# Patient Record
Sex: MALE | Race: BLACK OR AFRICAN AMERICAN | NOT HISPANIC OR LATINO | URBAN - METROPOLITAN AREA
[De-identification: names, ages, dates, MRNs, and addresses within clinical notes are randomized per-mention and may not be internally consistent; named-entity substitution may affect disease eponyms.]

---
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
## 2021-01-28 NOTE — ED PROVIDER NOTE - NS ED ATTENDING STATEMENT MOD
I have personally seen and examined this patient. I have fully participated in the care of this patient. I have reviewed all pertinent clinical information, including history physical exam, plan and the Resident's note and agree except as noted
Additional History: Would like cosmetic removal of angiomas.

## 2021-06-16 ENCOUNTER — INPATIENT (INPATIENT)
Facility: HOSPITAL | Age: 21
LOS: 6 days | Discharge: HOME | End: 2021-06-23
Attending: INTERNAL MEDICINE | Admitting: INTERNAL MEDICINE
Payer: MEDICAID

## 2021-06-16 VITALS
SYSTOLIC BLOOD PRESSURE: 140 MMHG | OXYGEN SATURATION: 93 % | WEIGHT: 210.1 LBS | TEMPERATURE: 98 F | RESPIRATION RATE: 25 BRPM | HEART RATE: 138 BPM | DIASTOLIC BLOOD PRESSURE: 90 MMHG | HEIGHT: 67 IN

## 2021-06-16 LAB
ANION GAP SERPL CALC-SCNC: 5 MMOL/L — SIGNIFICANT CHANGE UP (ref 5–17)
BASE EXCESS BLDA CALC-SCNC: -4 MMOL/L — LOW (ref -2–2)
BASE EXCESS BLDA CALC-SCNC: -5 MMOL/L — LOW (ref -2–2)
BASOPHILS # BLD AUTO: 0.02 K/UL — SIGNIFICANT CHANGE UP (ref 0–0.2)
BASOPHILS NFR BLD AUTO: 0.1 % — SIGNIFICANT CHANGE UP (ref 0–2)
BLOOD GAS COMMENTS: SIGNIFICANT CHANGE UP
BLOOD GAS SOURCE: SIGNIFICANT CHANGE UP
BLOOD GAS SOURCE: SIGNIFICANT CHANGE UP
BUN SERPL-MCNC: 11 MG/DL — SIGNIFICANT CHANGE UP (ref 7–23)
CALCIUM SERPL-MCNC: 8.5 MG/DL — SIGNIFICANT CHANGE UP (ref 8.5–10.1)
CHLORIDE SERPL-SCNC: 108 MMOL/L — SIGNIFICANT CHANGE UP (ref 96–108)
CO2 SERPL-SCNC: 27 MMOL/L — SIGNIFICANT CHANGE UP (ref 22–31)
CREAT SERPL-MCNC: 0.92 MG/DL — SIGNIFICANT CHANGE UP (ref 0.5–1.3)
EOSINOPHIL # BLD AUTO: 0 K/UL — SIGNIFICANT CHANGE UP (ref 0–0.5)
EOSINOPHIL NFR BLD AUTO: 0 % — SIGNIFICANT CHANGE UP (ref 0–6)
FLUAV AG NPH QL: SIGNIFICANT CHANGE UP
FLUBV AG NPH QL: SIGNIFICANT CHANGE UP
GLUCOSE SERPL-MCNC: 114 MG/DL — HIGH (ref 70–99)
HCO3 BLDA-SCNC: 22 MMOL/L — SIGNIFICANT CHANGE UP (ref 21–29)
HCO3 BLDA-SCNC: 24 MMOL/L — SIGNIFICANT CHANGE UP (ref 21–29)
HCT VFR BLD CALC: 47.9 % — SIGNIFICANT CHANGE UP (ref 39–50)
HCT VFR BLD CALC: 50.7 % — HIGH (ref 39–50)
HGB BLD-MCNC: 15.2 G/DL — SIGNIFICANT CHANGE UP (ref 13–17)
HGB BLD-MCNC: 16.4 G/DL — SIGNIFICANT CHANGE UP (ref 13–17)
HOROWITZ INDEX BLDA+IHG-RTO: 100 — SIGNIFICANT CHANGE UP
HOROWITZ INDEX BLDA+IHG-RTO: 100 — SIGNIFICANT CHANGE UP
IMM GRANULOCYTES NFR BLD AUTO: 0.9 % — SIGNIFICANT CHANGE UP (ref 0–1.5)
LYMPHOCYTES # BLD AUTO: 0.6 K/UL — LOW (ref 1–3.3)
LYMPHOCYTES # BLD AUTO: 3.4 % — LOW (ref 13–44)
MCHC RBC-ENTMCNC: 27.2 PG — SIGNIFICANT CHANGE UP (ref 27–34)
MCHC RBC-ENTMCNC: 27.4 PG — SIGNIFICANT CHANGE UP (ref 27–34)
MCHC RBC-ENTMCNC: 31.7 GM/DL — LOW (ref 32–36)
MCHC RBC-ENTMCNC: 32.3 GM/DL — SIGNIFICANT CHANGE UP (ref 32–36)
MCV RBC AUTO: 83.9 FL — SIGNIFICANT CHANGE UP (ref 80–100)
MCV RBC AUTO: 86.3 FL — SIGNIFICANT CHANGE UP (ref 80–100)
MONOCYTES # BLD AUTO: 0.23 K/UL — SIGNIFICANT CHANGE UP (ref 0–0.9)
MONOCYTES NFR BLD AUTO: 1.3 % — LOW (ref 2–14)
NEUTROPHILS # BLD AUTO: 16.4 K/UL — HIGH (ref 1.8–7.4)
NEUTROPHILS NFR BLD AUTO: 94.3 % — HIGH (ref 43–77)
NRBC # BLD: 0 /100 WBCS — SIGNIFICANT CHANGE UP (ref 0–0)
NRBC # BLD: 0 /100 WBCS — SIGNIFICANT CHANGE UP (ref 0–0)
PCO2 BLDA: 45 MMHG — SIGNIFICANT CHANGE UP (ref 32–46)
PCO2 BLDA: 62 MMHG — HIGH (ref 32–46)
PH BLD: 7.21 — LOW (ref 7.35–7.45)
PH BLD: 7.31 — LOW (ref 7.35–7.45)
PLATELET # BLD AUTO: 238 K/UL — SIGNIFICANT CHANGE UP (ref 150–400)
PLATELET # BLD AUTO: 241 K/UL — SIGNIFICANT CHANGE UP (ref 150–400)
PO2 BLDA: 204 MMHG — HIGH (ref 74–108)
PO2 BLDA: 205 MMHG — HIGH (ref 74–108)
POTASSIUM SERPL-MCNC: 3.8 MMOL/L — SIGNIFICANT CHANGE UP (ref 3.5–5.3)
POTASSIUM SERPL-SCNC: 3.8 MMOL/L — SIGNIFICANT CHANGE UP (ref 3.5–5.3)
RBC # BLD: 5.55 M/UL — SIGNIFICANT CHANGE UP (ref 4.2–5.8)
RBC # BLD: 6.04 M/UL — HIGH (ref 4.2–5.8)
RBC # FLD: 14.1 % — SIGNIFICANT CHANGE UP (ref 10.3–14.5)
RBC # FLD: 14.2 % — SIGNIFICANT CHANGE UP (ref 10.3–14.5)
SAO2 % BLDA: 99 % — HIGH (ref 92–96)
SAO2 % BLDA: 99 % — HIGH (ref 92–96)
SARS-COV-2 RNA SPEC QL NAA+PROBE: SIGNIFICANT CHANGE UP
SODIUM SERPL-SCNC: 140 MMOL/L — SIGNIFICANT CHANGE UP (ref 135–145)
WBC # BLD: 14.51 K/UL — HIGH (ref 3.8–10.5)
WBC # BLD: 17.4 K/UL — HIGH (ref 3.8–10.5)
WBC # FLD AUTO: 14.51 K/UL — HIGH (ref 3.8–10.5)
WBC # FLD AUTO: 17.4 K/UL — HIGH (ref 3.8–10.5)

## 2021-06-16 PROCEDURE — 71045 X-RAY EXAM CHEST 1 VIEW: CPT | Mod: 26,77

## 2021-06-16 PROCEDURE — 93010 ELECTROCARDIOGRAM REPORT: CPT

## 2021-06-16 PROCEDURE — 99291 CRITICAL CARE FIRST HOUR: CPT

## 2021-06-16 PROCEDURE — 99292 CRITICAL CARE ADDL 30 MIN: CPT

## 2021-06-16 PROCEDURE — 71045 X-RAY EXAM CHEST 1 VIEW: CPT | Mod: 26

## 2021-06-16 RX ORDER — ALBUTEROL 90 UG/1
1 AEROSOL, METERED ORAL EVERY 4 HOURS
Refills: 0 | Status: DISCONTINUED | OUTPATIENT
Start: 2021-06-16 | End: 2021-06-16

## 2021-06-16 RX ORDER — CISATRACURIUM BESYLATE 2 MG/ML
3 INJECTION INTRAVENOUS
Qty: 200 | Refills: 0 | Status: DISCONTINUED | OUTPATIENT
Start: 2021-06-16 | End: 2021-06-18

## 2021-06-16 RX ORDER — TIOTROPIUM BROMIDE 18 UG/1
1 CAPSULE ORAL; RESPIRATORY (INHALATION) DAILY
Refills: 0 | Status: DISCONTINUED | OUTPATIENT
Start: 2021-06-16 | End: 2021-06-16

## 2021-06-16 RX ORDER — EPINEPHRINE 0.3 MG/.3ML
0.3 INJECTION INTRAMUSCULAR; SUBCUTANEOUS ONCE
Refills: 0 | Status: COMPLETED | OUTPATIENT
Start: 2021-06-16 | End: 2021-06-16

## 2021-06-16 RX ORDER — ROCURONIUM BROMIDE 10 MG/ML
100 VIAL (ML) INTRAVENOUS ONCE
Refills: 0 | Status: COMPLETED | OUTPATIENT
Start: 2021-06-16 | End: 2021-06-16

## 2021-06-16 RX ORDER — FENTANYL CITRATE 50 UG/ML
100 INJECTION INTRAVENOUS ONCE
Refills: 0 | Status: DISCONTINUED | OUTPATIENT
Start: 2021-06-16 | End: 2021-06-16

## 2021-06-16 RX ORDER — ETOMIDATE 2 MG/ML
20 INJECTION INTRAVENOUS ONCE
Refills: 0 | Status: COMPLETED | OUTPATIENT
Start: 2021-06-16 | End: 2021-06-16

## 2021-06-16 RX ORDER — ALBUTEROL 90 UG/1
2.5 AEROSOL, METERED ORAL
Refills: 0 | Status: COMPLETED | OUTPATIENT
Start: 2021-06-16 | End: 2021-06-16

## 2021-06-16 RX ORDER — SODIUM CHLORIDE 9 MG/ML
1000 INJECTION INTRAMUSCULAR; INTRAVENOUS; SUBCUTANEOUS ONCE
Refills: 0 | Status: COMPLETED | OUTPATIENT
Start: 2021-06-16 | End: 2021-06-16

## 2021-06-16 RX ORDER — CISATRACURIUM BESYLATE 2 MG/ML
10 INJECTION INTRAVENOUS ONCE
Refills: 0 | Status: COMPLETED | OUTPATIENT
Start: 2021-06-16 | End: 2021-06-16

## 2021-06-16 RX ORDER — MAGNESIUM SULFATE 500 MG/ML
2 VIAL (ML) INJECTION ONCE
Refills: 0 | Status: COMPLETED | OUTPATIENT
Start: 2021-06-16 | End: 2021-06-16

## 2021-06-16 RX ORDER — CHLORHEXIDINE GLUCONATE 213 G/1000ML
15 SOLUTION TOPICAL EVERY 12 HOURS
Refills: 0 | Status: DISCONTINUED | OUTPATIENT
Start: 2021-06-16 | End: 2021-06-16

## 2021-06-16 RX ORDER — CHLORHEXIDINE GLUCONATE 213 G/1000ML
1 SOLUTION TOPICAL
Refills: 0 | Status: DISCONTINUED | OUTPATIENT
Start: 2021-06-16 | End: 2021-06-23

## 2021-06-16 RX ORDER — IPRATROPIUM/ALBUTEROL SULFATE 18-103MCG
3 AEROSOL WITH ADAPTER (GRAM) INHALATION ONCE
Refills: 0 | Status: COMPLETED | OUTPATIENT
Start: 2021-06-16 | End: 2021-06-16

## 2021-06-16 RX ORDER — IPRATROPIUM/ALBUTEROL SULFATE 18-103MCG
3 AEROSOL WITH ADAPTER (GRAM) INHALATION EVERY 6 HOURS
Refills: 0 | Status: DISCONTINUED | OUTPATIENT
Start: 2021-06-16 | End: 2021-06-17

## 2021-06-16 RX ORDER — HEPARIN SODIUM 5000 [USP'U]/ML
5000 INJECTION INTRAVENOUS; SUBCUTANEOUS EVERY 8 HOURS
Refills: 0 | Status: DISCONTINUED | OUTPATIENT
Start: 2021-06-16 | End: 2021-06-18

## 2021-06-16 RX ORDER — SODIUM CHLORIDE 9 MG/ML
1000 INJECTION, SOLUTION INTRAVENOUS
Refills: 0 | Status: DISCONTINUED | OUTPATIENT
Start: 2021-06-16 | End: 2021-06-17

## 2021-06-16 RX ORDER — PROPOFOL 10 MG/ML
10 INJECTION, EMULSION INTRAVENOUS
Qty: 1000 | Refills: 0 | Status: DISCONTINUED | OUTPATIENT
Start: 2021-06-16 | End: 2021-06-20

## 2021-06-16 RX ORDER — KETAMINE HYDROCHLORIDE 100 MG/ML
0.25 INJECTION INTRAMUSCULAR; INTRAVENOUS
Qty: 1000 | Refills: 0 | Status: DISCONTINUED | OUTPATIENT
Start: 2021-06-16 | End: 2021-06-20

## 2021-06-16 RX ORDER — CHLORHEXIDINE GLUCONATE 213 G/1000ML
15 SOLUTION TOPICAL EVERY 12 HOURS
Refills: 0 | Status: DISCONTINUED | OUTPATIENT
Start: 2021-06-16 | End: 2021-06-17

## 2021-06-16 RX ORDER — PROPOFOL 10 MG/ML
20 INJECTION, EMULSION INTRAVENOUS
Qty: 500 | Refills: 0 | Status: DISCONTINUED | OUTPATIENT
Start: 2021-06-16 | End: 2021-06-16

## 2021-06-16 RX ORDER — DEXMEDETOMIDINE HYDROCHLORIDE IN 0.9% SODIUM CHLORIDE 4 UG/ML
0.2 INJECTION INTRAVENOUS
Qty: 200 | Refills: 0 | Status: DISCONTINUED | OUTPATIENT
Start: 2021-06-16 | End: 2021-06-18

## 2021-06-16 RX ORDER — FENTANYL CITRATE 50 UG/ML
50 INJECTION INTRAVENOUS ONCE
Refills: 0 | Status: DISCONTINUED | OUTPATIENT
Start: 2021-06-16 | End: 2021-06-16

## 2021-06-16 RX ORDER — IPRATROPIUM BROMIDE 0.2 MG/ML
500 SOLUTION, NON-ORAL INHALATION ONCE
Refills: 0 | Status: COMPLETED | OUTPATIENT
Start: 2021-06-16 | End: 2021-06-16

## 2021-06-16 RX ORDER — AZITHROMYCIN 500 MG/1
500 TABLET, FILM COATED ORAL ONCE
Refills: 0 | Status: COMPLETED | OUTPATIENT
Start: 2021-06-16 | End: 2021-06-16

## 2021-06-16 RX ADMIN — FENTANYL CITRATE 50 MICROGRAM(S): 50 INJECTION INTRAVENOUS at 20:36

## 2021-06-16 RX ADMIN — PROPOFOL 11.4 MICROGRAM(S)/KG/MIN: 10 INJECTION, EMULSION INTRAVENOUS at 19:35

## 2021-06-16 RX ADMIN — AZITHROMYCIN 255 MILLIGRAM(S): 500 TABLET, FILM COATED ORAL at 17:29

## 2021-06-16 RX ADMIN — CISATRACURIUM BESYLATE 10 MILLIGRAM(S): 2 INJECTION INTRAVENOUS at 23:45

## 2021-06-16 RX ADMIN — ALBUTEROL 2.5 MILLIGRAM(S): 90 AEROSOL, METERED ORAL at 15:22

## 2021-06-16 RX ADMIN — FENTANYL CITRATE 100 MICROGRAM(S): 50 INJECTION INTRAVENOUS at 21:17

## 2021-06-16 RX ADMIN — ALBUTEROL 2.5 MILLIGRAM(S): 90 AEROSOL, METERED ORAL at 14:02

## 2021-06-16 RX ADMIN — SODIUM CHLORIDE 1000 MILLILITER(S): 9 INJECTION INTRAMUSCULAR; INTRAVENOUS; SUBCUTANEOUS at 15:22

## 2021-06-16 RX ADMIN — Medication 500 MICROGRAM(S): at 21:44

## 2021-06-16 RX ADMIN — ALBUTEROL 2.5 MILLIGRAM(S): 90 AEROSOL, METERED ORAL at 14:17

## 2021-06-16 RX ADMIN — ALBUTEROL 2.5 MILLIGRAM(S): 90 AEROSOL, METERED ORAL at 14:59

## 2021-06-16 RX ADMIN — SODIUM CHLORIDE 1000 MILLILITER(S): 9 INJECTION INTRAMUSCULAR; INTRAVENOUS; SUBCUTANEOUS at 16:22

## 2021-06-16 RX ADMIN — ALBUTEROL 2.5 MILLIGRAM(S): 90 AEROSOL, METERED ORAL at 16:02

## 2021-06-16 RX ADMIN — DEXMEDETOMIDINE HYDROCHLORIDE IN 0.9% SODIUM CHLORIDE 4.77 MICROGRAM(S)/KG/HR: 4 INJECTION INTRAVENOUS at 21:17

## 2021-06-16 RX ADMIN — Medication 40 MILLIGRAM(S): at 19:00

## 2021-06-16 RX ADMIN — KETAMINE HYDROCHLORIDE 2.38 MG/KG/HR: 100 INJECTION INTRAMUSCULAR; INTRAVENOUS at 20:09

## 2021-06-16 RX ADMIN — FENTANYL CITRATE 50 MICROGRAM(S): 50 INJECTION INTRAVENOUS at 21:06

## 2021-06-16 RX ADMIN — HEPARIN SODIUM 5000 UNIT(S): 5000 INJECTION INTRAVENOUS; SUBCUTANEOUS at 21:17

## 2021-06-16 RX ADMIN — ETOMIDATE 20 MILLIGRAM(S): 2 INJECTION INTRAVENOUS at 19:03

## 2021-06-16 RX ADMIN — Medication 2 GRAM(S): at 15:00

## 2021-06-16 RX ADMIN — SODIUM CHLORIDE 1000 MILLILITER(S): 9 INJECTION INTRAMUSCULAR; INTRAVENOUS; SUBCUTANEOUS at 15:00

## 2021-06-16 RX ADMIN — Medication 50 GRAM(S): at 14:02

## 2021-06-16 RX ADMIN — ALBUTEROL 2.5 MILLIGRAM(S): 90 AEROSOL, METERED ORAL at 15:42

## 2021-06-16 RX ADMIN — Medication 125 MILLIGRAM(S): at 17:29

## 2021-06-16 RX ADMIN — FENTANYL CITRATE 100 MICROGRAM(S): 50 INJECTION INTRAVENOUS at 21:16

## 2021-06-16 RX ADMIN — AZITHROMYCIN 500 MILLIGRAM(S): 500 TABLET, FILM COATED ORAL at 18:28

## 2021-06-16 RX ADMIN — Medication 100 MILLIGRAM(S): at 19:03

## 2021-06-16 RX ADMIN — Medication 3 MILLILITER(S): at 14:02

## 2021-06-16 RX ADMIN — EPINEPHRINE 0.3 MILLIGRAM(S): 0.3 INJECTION INTRAMUSCULAR; SUBCUTANEOUS at 18:47

## 2021-06-16 RX ADMIN — SODIUM CHLORIDE 1000 MILLILITER(S): 9 INJECTION INTRAMUSCULAR; INTRAVENOUS; SUBCUTANEOUS at 14:02

## 2021-06-16 NOTE — H&P ADULT - ASSESSMENT
21 yo male presented with SOB and wheezing     1. Acute hypoxic respiratory failure secondary to asthma  exacerbation   Admit to medicine   s/p BIPAP , Solumedrol and bronchodilator in ED   Start Solumedrol 40 mg IV q6   DUONEB q6       21 yo male presented with SOB and wheezing     1. Acute hypoxic respiratory failure secondary to asthma  exacerbation   Admit to medicine   s/p BIPAP , Solumedrol and bronchodilator in ED   Start Solumedrol 40 mg IV q6   DUONEB q6

## 2021-06-16 NOTE — ED PROVIDER NOTE - OBJECTIVE STATEMENT
Pt is a 19 yo male w/PMH of asthma, without prior hospitalization, presents to the ED BIBEMS for SOB and wheezing associated with a mild cough since this morning. No fever, CP, leg swelling, or pain. Pt used inhaler without help, received magnesium nebulizers and decadron from EMS.

## 2021-06-16 NOTE — ED PROVIDER NOTE - PRINCIPAL DIAGNOSIS
Severe asthma with exacerbation, unspecified whether persistent Severe persistent asthma with status asthmaticus

## 2021-06-16 NOTE — CONSULT NOTE ADULT - SUBJECTIVE AND OBJECTIVE BOX
Pt is a 21 yo male w/PMH of asthma, without prior hospitalization, presents to the ED BIBEMS for SOB and wheezing associated with a mild cough since this morning. No fever, CP, leg swelling, or pain. Pt used inhaler without help, received magnesium, nebulizers, and decadron from EMS. Getting Azithromycin 500mg and Solumedrol 125 mg IVP at time of assessment in ER. Pt appeared comfortable, breathing 18-20/minute w/O2 sat 93-96% on 3L NC in no acute distress. Pt endorses suffering from asthma since he was approximately 5 years old. As of now, the only prescription he uses to manage his asthma is Albuterol, which he admits to using as much as 5x daily. Of note, he states waking up in the middle of the night ~3x/week with asthma symptoms requiring inhaler use. On assessment, patient has mild/mod bilateral wheezing, but with good air entry/movement. Pt is tachycardic 120's-130's, but likely associated with repeated albuterol doses at home, w/EMS, and in ED. Pt being placed on BiPaP at time of assessment.     Based off assessment, pt is not clinically deteriorating and is able to protect his airway at this time. /72 on monitor; patient is medically appropriate for transfer to tele/medicine floor.       CBC Full  -  ( 16 Jun 2021 14:24 )  WBC Count : 14.51 K/uL  RBC Count : 6.04 M/uL  Hemoglobin : 16.4 g/dL  Hematocrit : 50.7 %  Platelet Count - Automated : 238 K/uL  Mean Cell Volume : 83.9 fl  Mean Cell Hemoglobin : 27.2 pg  Mean Cell Hemoglobin Concentration : 32.3 gm/dL  Auto Neutrophil # : x  Auto Lymphocyte # : x  Auto Monocyte # : x  Auto Eosinophil # : x  Auto Basophil # : x  Auto Neutrophil % : x  Auto Lymphocyte % : x  Auto Monocyte % : x  Auto Eosinophil % : x  Auto Basophil % : x    BMI: BMI (kg/m2): 32.9 (06-16-21 @ 13:32)  HbA1c:   Glucose:   BP: 125/72 (06-16-21 @ 15:35) (125/72 - 140/90)  Lipid Panel:       VITALS  ========  Vital Signs Last 24 Hrs  T(C): 36.8 (16 Jun 2021 15:35), Max: 36.9 (16 Jun 2021 13:32)  T(F): 98.3 (16 Jun 2021 15:35), Max: 98.5 (16 Jun 2021 13:32)  HR: 127 (16 Jun 2021 17:40) (123 - 138)  BP: 125/72 (16 Jun 2021 15:35) (125/72 - 140/90)  BP(mean): --  RR: 24 (16 Jun 2021 15:42) (19 - 25)  SpO2: 98% (16 Jun 2021 17:40) (92% - 98%)  I&O's Summary      LABS                                            16.4                  Neurophils% (auto):   x      (06-16 @ 14:24):    14.51)-----------(238          Lymphocytes% (auto):  x                                             50.7                   Eosinphils% (auto):   x        Manual%: Neutrophils x    ; Lymphocytes x    ; Eosinophils x    ; Bands%: x    ; Blasts x                                    140    |  108    |  11                  Calcium: 8.5   / iCa: x      (06-16 @ 14:24)    ----------------------------<  114       Magnesium: x                                3.8     |  27     |  0.92             Phosphorous: x                   Pt is a 19 yo male w/PMH of asthma, without prior hospitalization, presents to the ED BIBEMS for SOB and wheezing associated with a mild cough since this morning. No fever, CP, leg swelling, or pain. Pt used inhaler without help, received magnesium, nebulizers, and decadron from EMS. Getting Azithromycin 500mg and Solumedrol 125 mg IVP at time of assessment in ER. Pt appeared comfortable, breathing 18-20/minute w/O2 sat 93-96% on 3L NC in no acute distress. Pt endorses suffering from asthma since he was approximately 5 years old. As of now, the only prescription he uses to manage his asthma is Albuterol, which he admits to using as much as 5x daily. Of note, he states waking up in the middle of the night ~3x/week with asthma symptoms requiring inhaler use. On assessment, patient has mild/mod bilateral wheezing, but with good air entry/movement. Pt is tachycardic 120's-130's, but likely associated with repeated albuterol doses at home, w/EMS, and in ED. Pt being placed on BiPaP at time of assessment.       CBC Full  -  ( 16 Jun 2021 14:24 )  WBC Count : 14.51 K/uL  RBC Count : 6.04 M/uL  Hemoglobin : 16.4 g/dL  Hematocrit : 50.7 %  Platelet Count - Automated : 238 K/uL  Mean Cell Volume : 83.9 fl  Mean Cell Hemoglobin : 27.2 pg  Mean Cell Hemoglobin Concentration : 32.3 gm/dL  Auto Neutrophil # : x  Auto Lymphocyte # : x  Auto Monocyte # : x  Auto Eosinophil # : x  Auto Basophil # : x  Auto Neutrophil % : x  Auto Lymphocyte % : x  Auto Monocyte % : x  Auto Eosinophil % : x  Auto Basophil % : x    BMI: BMI (kg/m2): 32.9 (06-16-21 @ 13:32)  HbA1c:   Glucose:   BP: 125/72 (06-16-21 @ 15:35) (125/72 - 140/90)  Lipid Panel:       VITALS  ========  Vital Signs Last 24 Hrs  T(C): 36.8 (16 Jun 2021 15:35), Max: 36.9 (16 Jun 2021 13:32)  T(F): 98.3 (16 Jun 2021 15:35), Max: 98.5 (16 Jun 2021 13:32)  HR: 127 (16 Jun 2021 17:40) (123 - 138)  BP: 125/72 (16 Jun 2021 15:35) (125/72 - 140/90)  BP(mean): --  RR: 24 (16 Jun 2021 15:42) (19 - 25)  SpO2: 98% (16 Jun 2021 17:40) (92% - 98%)  I&O's Summary      LABS                                            16.4                  Neurophils% (auto):   x      (06-16 @ 14:24):    14.51)-----------(238          Lymphocytes% (auto):  x                                             50.7                   Eosinphils% (auto):   x        Manual%: Neutrophils x    ; Lymphocytes x    ; Eosinophils x    ; Bands%: x    ; Blasts x                                    140    |  108    |  11                  Calcium: 8.5   / iCa: x      (06-16 @ 14:24)    ----------------------------<  114       Magnesium: x                                3.8     |  27     |  0.92             Phosphorous: x                   19 yo male w/PMH of asthma, without prior hospitalization, presents to the ED BIBEMS for SOB and wheezing associated with a mild cough since this morning. No fever, CP, leg swelling, or pain. Pt used inhaler without help, received magnesium, nebulizers, and decadron from EMS. Getting Azithromycin 500mg and Solumedrol 125 mg IVP at time of assessment in ER. Pt appeared comfortable, breathing 18-20/minute w/O2 sat 93-96% on 3L NC in no acute distress. Pt endorses suffering from asthma since he was approximately 5 years old. As of now, the only prescription he uses to manage his asthma is Albuterol, which he admits to using as much as 5x daily. Of note, he states waking up in the middle of the night ~3x/week with asthma symptoms requiring inhaler use. On assessment, patient has mild/mod bilateral wheezing, but with good air entry/movement. Pt is tachycardic 120's-130's, but likely associated with repeated albuterol doses at home, w/EMS, and in ED. Pt being placed on BiPaP at time of assessment. Pt was taken to CT and received IM epi beforehand. Pt was s/p RRT for SOB while laying flat and was emergently intubated for airway protection. Admit to ICU for further management.      CBC Full  -  ( 16 Jun 2021 14:24 )  WBC Count : 14.51 K/uL  RBC Count : 6.04 M/uL  Hemoglobin : 16.4 g/dL  Hematocrit : 50.7 %  Platelet Count - Automated : 238 K/uL  Mean Cell Volume : 83.9 fl  Mean Cell Hemoglobin : 27.2 pg  Mean Cell Hemoglobin Concentration : 32.3 gm/dL  Auto Neutrophil # : x  Auto Lymphocyte # : x  Auto Monocyte # : x  Auto Eosinophil # : x  Auto Basophil # : x  Auto Neutrophil % : x  Auto Lymphocyte % : x  Auto Monocyte % : x  Auto Eosinophil % : x  Auto Basophil % : x    BMI: BMI (kg/m2): 32.9 (06-16-21 @ 13:32)  HbA1c:   Glucose:   BP: 125/72 (06-16-21 @ 15:35) (125/72 - 140/90)  Lipid Panel:       VITALS  ========  Vital Signs Last 24 Hrs  T(C): 36.8 (16 Jun 2021 15:35), Max: 36.9 (16 Jun 2021 13:32)  T(F): 98.3 (16 Jun 2021 15:35), Max: 98.5 (16 Jun 2021 13:32)  HR: 127 (16 Jun 2021 17:40) (123 - 138)  BP: 125/72 (16 Jun 2021 15:35) (125/72 - 140/90)  BP(mean): --  RR: 24 (16 Jun 2021 15:42) (19 - 25)  SpO2: 98% (16 Jun 2021 17:40) (92% - 98%)  I&O's Summary      LABS                                            16.4                  Neurophils% (auto):   x      (06-16 @ 14:24):    14.51)-----------(238          Lymphocytes% (auto):  x                                             50.7                   Eosinphils% (auto):   x        Manual%: Neutrophils x    ; Lymphocytes x    ; Eosinophils x    ; Bands%: x    ; Blasts x                                    140    |  108    |  11                  Calcium: 8.5   / iCa: x      (06-16 @ 14:24)    ----------------------------<  114       Magnesium: x                                3.8     |  27     |  0.92             Phosphorous: x

## 2021-06-16 NOTE — ED PROVIDER NOTE - PROGRESS NOTE DETAILS
despite multiple rounds of tretment patient persistnetly wheezing in ED. hypoxic to 92% with tachypnea. will place on bipap for relief mirian develops chest pain after ebingt placed on bipap. bedside US without sign of PTX, no right heart strain. CXR with no sign of PTX. CT ordered. IM epi ordered. mirian sudedenly decompensates with severe tachypnea and agitation 2/2 hypoxia, hypoxic to 70% on NRB. intubated for severe hypoxia and status asmaticus

## 2021-06-16 NOTE — ED ADULT NURSE REASSESSMENT NOTE - NS ED NURSE REASSESS COMMENT FT1
Assuming patient's care for coverage. Report received from RN and patient informed during rounding. Assessment available on KBC. Will continue to monitor  Endorsed to by covering RN that pt received 2 out of 3 nebs, magnesium IV and IV fluids completed, pt assessed and states he feels much better

## 2021-06-16 NOTE — SBIRT NOTE ADULT - NSSBIRTDRGBRIEFINTDET_GEN_A_CORE
Provided SBIRT services: Full screen positive. Pt reports marijuana use every other day. Brief Intervention Performed. Screening results were reviewed with the patient and patient was provided information about healthy guidelines and potential negative consequences associated with level of risk. Motivation and readiness to reduce or stop use was discussed and goals and activities to make changes were suggested/offered. Pt not interested in reduction/cessation at this time.

## 2021-06-16 NOTE — ED ADULT NURSE NOTE - ED STAT RN HANDOFF DETAILS
Report endorsed to oncoming ED HOLD/ICU Mayra LAINEZ. Safety checks compld this shift/Safety rounds completed hourly.  IV sites checked Q2+remains WDL. Meds given as ord with no s/s of adverse RXNs. Fall +skin precs in place. Any issues endorsed to oncoming RN for follow up.  follow up on the meds, reassess airway and vent settings, Report endorsed to oncoming ED HOLD/ICU Mayra LAINEZ. Safety checks compld this shift/Safety rounds completed hourly.  IV sites checked Q2+remains WDL. Meds given as ord with no s/s of adverse RXNs. Fall +skin precs in place. Any issues endorsed to oncoming RN for follow up.  follow up on the meds, reassess airway and vent settings,pt is intubated with 7.5 ETT and 21 at the lip, pt was emergently intubated ay 19:06

## 2021-06-16 NOTE — H&P ADULT - ATTENDING COMMENTS
Agree with above.  20M PMH asthma (first diagnosed as a child), appears to have moderate–severe persistent asthma (only on rescue inhalers, uses 2–4x daily) active marijuana use (smokes) never intubated, presented with wheezing and dyspnea.  Initially did not require ICU. However, had RRT when lying flat in CT scanner, and was emergently brought back to ED and intubated.    Now plateau mid-20s, peaks in the 40s. Has been given steroids / NEBs. Tachycardic, but likely consequence of significant beta-agonist administration (including IM epi given in ED).     Multiple vent changes made. Now paralyzed for ventilator synchrony.  - Check RVP  - empiric abx with ceftriaxone / azithromycin  - steroids  - DuoNEBs Q6h and albuterol Q2h ATC for now.  - Hyperkalemia: insulin / D50, c/w albuterol, and give some Lasix (20mg IVP should be sufficient) for kaluresis.  - Will also give 1L 150mEq bicarb for hyperkalemia and mild metabolic acidosis (BE -5 and hyperchloremic)  - Analysis of vent waveforms shows significantly slurred uptake on expiratory flow, consistent with obstructive ventilatory defect. Changed vent settings to maximize ventilation while remaining mindful of avoiding autoPEEP. CXR shows no pneumothorax.  - Elevated peak pressures are *not* worrisome but rather are indicative of the obstructive ventilatory defect. As long as plateau remains =30, we can increase VT as tolerated. Would not decrease I-time below 0.7sec.  - Repeat ABG in an hour.  - POCU/S shows good cardiac contractility with tachycardia. No RV dilation.  - CXR with no evidence of pneumonia.  - Continue to monitor in ICU while on vent.    I have personally provided 75 minutes of critical care time. Agree with above.  20M PMH asthma (first diagnosed as a child), appears to have moderate–severe persistent asthma (only on rescue inhalers, uses 2–4x daily) active marijuana use (smokes) never intubated, presented with wheezing and dyspnea.  Initially did not require ICU. However, had RRT when lying flat in CT scanner, and was emergently brought back to ED and intubated.    Now plateau mid-20s, peaks in the 40s. Has been given steroids / NEBs. Tachycardic, but likely consequence of significant beta-agonist administration (including IM epi given in ED).     Multiple vent changes made. Now paralyzed for ventilator synchrony.  - Check RVP  - empiric abx with ceftriaxone / azithromycin  - steroids  - DuoNEBs Q6h and albuterol Q2h ATC for now.  - Hyperkalemia: insulin / D50, c/w albuterol, and give some Lasix (20mg IVP should be sufficient) for kaluresis.  - Will also give 1L 150mEq bicarb for hyperkalemia and mild metabolic acidosis (BE -5 and hyperchloremic)  - Analysis of vent waveforms shows significantly slurred uptake on expiratory flow, consistent with obstructive ventilatory defect. Changed vent settings to maximize ventilation while remaining mindful of avoiding autoPEEP. CXR shows no pneumothorax.  - Elevated peak pressures are *not* worrisome but rather are indicative of the obstructive ventilatory defect. As long as plateau remains =30, we can increase VT as tolerated. Would not decrease I-time below 0.7sec.  - Repeat ABG in an hour.  - Ketamine for sedation and possibly to help with bronchodilation.  - Additional sedation with propofol. Can consider fentanyl pushes as needed.  - POCU/S shows good cardiac contractility with tachycardia. No RV dilation.  - CXR with no evidence of pneumonia.  - Continue to monitor in ICU while on vent.    I have personally provided 75 minutes of critical care time.

## 2021-06-16 NOTE — H&P ADULT - ASSESSMENT
20 y.o. male w/PMHx significant for asthma no hx of asthma related hospitalizations or intubation, now intubated for airway protection    Neuro:  - ketamine and prop for sedation  Cardio:  - no issues  Resp:  - hx asthma, now with first time intubation for airway protection/asthma exacerbation and hypoxia  - steroids, scheduled nebs  - wean ventilator when apprpriate  - CXR, ABG  GI:   - NPO for now  Renal:  - Monitor Cr  Infectious Disease  - no issues  Heme:  - DVT ppx HSQ  Endocrine:  - Monitor FS as may increase due to steroids  :  - arlette for I+O    Disposition: Admit to ICU

## 2021-06-16 NOTE — ED PROVIDER NOTE - NS_ATTENDINGSCRIBE_ED_ALL_ED
1 month refill sent. Needs DM f/u.    Tom Bliss PA-C     I personally performed the service described in the documentation recorded by the scribe in my presence, and it accurately and completely records my words and actions.

## 2021-06-16 NOTE — ED PROVIDER NOTE - CARE PLAN
Principal Discharge DX:	Severe asthma with exacerbation, unspecified whether persistent   Principal Discharge DX:	Severe persistent asthma with status asthmaticus  Secondary Diagnosis:	Respiratory failure

## 2021-06-16 NOTE — H&P ADULT - NSHPREVIEWOFSYSTEMS_GEN_ALL_CORE
Review of Systems:   · General	negative  · Skin/Breast	negative  · Ophthalmologic	negative  · ENMT	negative  · Respiratory and Thorax Symptoms	wheezing  dyspnea  · Cardiovascular	negative  · Gastrointestinal	negative  · Genitourinary	negative  · Musculoskeletal	negative  · Neurological	negative  · Psychiatric	negative  · Hematology/Lymphatics	negative  · Endocrine	negative  · Allergic/Immunologic	negative

## 2021-06-16 NOTE — ED ADULT NURSE NOTE - OBJECTIVE STATEMENT
received er bed 17 c/o asthma exacerbation with cough, shortness of breath and wheezing since morning slight pleuritic chest discomfort used albuterol inhaler at home prior to arrival without relief speaking full sentences without difficulty noted skin warm dry color good denies prior hx of intubation

## 2021-06-16 NOTE — PATIENT PROFILE ADULT - OVER THE PAST TWO WEEKS, HAVE YOU FELT LITTLE INTEREST OR PLEASURE IN DOING THINGS?
You can access the [facility] Patient Portal, offered by [facility], by registering with the following website: [email or web address]
pt intubated/sedated

## 2021-06-16 NOTE — ED ADULT NURSE NOTE - CHIEF COMPLAINT QUOTE
BIBA- from home Asthma exacerbation since 6am  Albuterol pump unsuccessful, never intubated in past  EMS- 2 Combivent, 12mg dexamethasone IV, 2gm MgSO4 IV  Right AC#20g

## 2021-06-16 NOTE — H&P ADULT - HISTORY OF PRESENT ILLNESS
19 yo male presented with SOB and wheezing 
21 yo male w/PMH of asthma, without prior hospitalization, presents to the ED BIBEMS for SOB and wheezing associated with a mild cough since this morning. No fever, CP, leg swelling, or pain. Pt used inhaler without help, received magnesium, nebulizers, and decadron from EMS. Getting Azithromycin 500mg and Solumedrol 125 mg IVP at time of assessment in ER. Pt appeared comfortable, breathing 18-20/minute w/O2 sat 93-96% on 3L NC in no acute distress. Pt endorses suffering from asthma since he was approximately 5 years old. As of now, the only prescription he uses to manage his asthma is Albuterol, which he admits to using as much as 5x daily. Of note, he states waking up in the middle of the night ~3x/week with asthma symptoms requiring inhaler use. On assessment, patient has mild/mod bilateral wheezing, but with good air entry/movement. Pt is tachycardic 120's-130's, but likely associated with repeated albuterol doses at home, w/EMS, and in ED. Pt being placed on BiPaP at time of assessment. Pt was taken to CT and received IM epi beforehand. Pt was s/p RRT for SOB while laying flat and was emergently intubated for airway protection. Admit to ICU for further management.

## 2021-06-16 NOTE — H&P ADULT - NSHPLABSRESULTS_GEN_ALL_CORE
CBC Full  -  ( 16 Jun 2021 14:24 )  WBC Count : 14.51 K/uL  RBC Count : 6.04 M/uL  Hemoglobin : 16.4 g/dL  Hematocrit : 50.7 %  Platelet Count - Automated : 238 K/uL  Mean Cell Volume : 83.9 fl  Mean Cell Hemoglobin : 27.2 pg  Mean Cell Hemoglobin Concentration : 32.3 gm/dL  Auto Neutrophil # : x  Auto Lymphocyte # : x  Auto Monocyte # : x  Auto Eosinophil # : x  Auto Basophil # : x  Auto Neutrophil % : x  Auto Lymphocyte % : x  Auto Monocyte % : x  Auto Eosinophil % : x  Auto Basophil % : x      06-16    140  |  108  |  11  ----------------------------<  114<H>  3.8   |  27  |  0.92    Ca    8.5      16 Jun 2021 14:24
.  LABS:                         16.4   14.51 )-----------( 238      ( 16 Jun 2021 14:24 )             50.7     06-16    140  |  108  |  11  ----------------------------<  114<H>  3.8   |  27  |  0.92    Ca    8.5      16 Jun 2021 14:24                RADIOLOGY, EKG & ADDITIONAL TESTS: Reviewed.

## 2021-06-16 NOTE — ED PROVIDER NOTE - CRITICAL CARE ATTENDING CONTRIBUTION TO CARE
multi modal treatment for asthma begun with worsening of patients condition, VS wnl, however patient having difficulty moving air. bipap ordered. multiple conversations held with ICU about management. patient develops chest pain after being placed on bipap. bedside US without sign of PTX, no right heart strain. CXR with no sign of PTX. CT ordered. IM epi ordered. patinet sudedenly decompensates with severe tachypnea and agitation 2/2 hypoxia, hypoxic to 70% on NRB. intubated for severe hypoxia and status asthmaticus

## 2021-06-16 NOTE — ED PROVIDER NOTE - CLINICAL SUMMARY MEDICAL DECISION MAKING FREE TEXT BOX
Pt presents w/ asthma exacerbation. Will empirically treat. Will evaluate for underlying infection w/ labs and chest x-ray.

## 2021-06-16 NOTE — ED ADULT TRIAGE NOTE - CHIEF COMPLAINT QUOTE
BIBA- from home Asthma exacerbation since 6am  Albuterol pump unsuccessful, never intubated in past  EMS- 2 combivent, 12mg dexamethasone IV, 2gm MgSO4 IV  Right AC#20g BIBA- from home Asthma exacerbation since 6am  Albuterol pump unsuccessful, never intubated in past  EMS- 2 Combivent, 12mg dexamethasone IV, 2gm MgSO4 IV  Right AC#20g

## 2021-06-16 NOTE — H&P ADULT - NSHPPHYSICALEXAM_GEN_ALL_CORE
Physical Exam:   · Constitutional	detailed exam  · Constitutional Details	well-groomed; respiratory distress; obese  · Respiratory Distress	mild  · Eyes	EOMI; PERRL; no drainage or redness  · ENMT	No oral lesions; no gross abnormalities  · Neck	No bruits; no thyromegaly or nodules  · Breasts	not examined  · Back	No deformity or limitation of movement  · Respiratory	detailed exam  · Respiratory Details	breath sounds equal; good air movement; wheezes  · Wheezes	upper L; lower L; lower R  · Cardiovascular	detailed exam  · Cardiovascular Details	regular rate and rhythm  · Cardiovascular Details	tachycardia; positive S1; positive S2  · Gastrointestinal	Soft, non-tender, no hepatosplenomegaly, normal bowel sounds  · Genitourinary	not examined  · Rectal	not examined  · Extremities	No cyanosis, clubbing or edema  · Vascular	Equal and normal pulses (carotid, femoral, dorsalis pedis)  · Neurological	Alert & oriented; no sensory, motor or coordination deficits, normal reflexes  · Skin	No lesions; no rash  · Lymph Nodes	No lymphadedenopathy  · Musculoskeletal	No joint pain, swelling or deformity; no limitation of movement  · Psychiatric	not examined

## 2021-06-16 NOTE — CONSULT NOTE ADULT - ASSESSMENT
This is a 20 y.o. male w/pmh significant for asthma. He has no previous hx of intubations or hospitalizations r/t asthma, and the condition is managed at home only w/albuterol presenting with an acute attack unrelieved by albuterol at home. VSS at this time, patient w/good air entry, satting 93-96% on 3L NC w/RR of 18-20. Patient medically stable for transfer to tele/medicine floor.     - BiPaP 50% placed in setting of acute asthma exacerbation; monitor sats, WOB/ s/s of clinical deterioration  - Admit to tele r/t tachycardia 120's-130's  - Daily steroids for asthma management   - npo while maintained on bipap    This is a 20 y.o. male w/pmh significant for asthma. He has no previous hx of intubations or hospitalizations r/t asthma, and the condition is managed at home only w/albuterol presenting with an acute attack unrelieved by albuterol at home. Based off assessment, pt is not clinically deteriorating and is able to protect his airway at this time. /72 on monitor; patient is medically appropriate for transfer to tele/medicine floor.       Recommendations    - BiPaP 50% placed in setting of acute asthma exacerbation; monitor sats, WOB/ s/s of clinical deterioration  - Admit to tele r/t tachycardia 120's-130's  - Nebs, steroids, Symbicort for asthma management      This is a 20 y.o. male w/pmh significant for asthma. He has no previous hx of intubations or hospitalizations r/t asthma, and the condition is managed at home only w/albuterol presenting with an acute attack unrelieved by albuterol at home. Based off assessment, pt is not clinically deteriorating and is able to protect his airway at this time. /72 on monitor; patient is medically appropriate for transfer to tele/medicine floor.     Recommendations    - BiPaP 50% placed in setting of acute asthma exacerbation; monitor sats, WOB/ s/s of clinical deterioration  - Admit to tele r/t tachycardia 120's-130's  - Nebs, steroids, Symbicort for asthma management      20 y.o. male w/PMHx significant for asthma no hx of asthma related hospitalizations or intubation, now intubated for airway protection    Neuro:  - ketamine and prop for sedation  Cardio:  - no issues  Resp:  - hx asthma, now with first time intubation for airway protection/asthma exacerbation and hypoxia  - steroids, scheduled nebs  - wean ventilator when apprpriate  - CXR, ABG  GI:   - NPO for now  Renal:  - Monitor Cr  Infectious Disease  - no issues  Heme:  - DVT ppx HSQ  Endocrine:  - Monitor FS as may increase due to steroids  :  - arlette for I+O    Disposition: Admit to ICU

## 2021-06-16 NOTE — ED ADULT NURSE NOTE - NSIMPLEMENTINTERV_GEN_ALL_ED
Implemented All Universal Safety Interventions:  [place] to call system. Call bell, personal items and telephone within reach. Instruct patient to call for assistance. Room bathroom lighting operational. Non-slip footwear when patient is off stretcher. Physically safe environment: no spills, clutter or unnecessary equipment. Stretcher in lowest position, wheels locked, appropriate side rails in place.

## 2021-06-16 NOTE — ED ADULT NURSE REASSESSMENT NOTE - NS ED NURSE REASSESS COMMENT FT1
pt had a RRT while going for CT of the chest, pt emergently brought back to ER [place] bed 4, pt intubated at 19:06  19:03 RSI meds given:  19:03 10mg etomidate   19:03 100 mg rocuronium   19:04 10mg etomidate   19:06 pt is intubated by MD Padmini; 7.5 tube and 21 at the lip  endorsed to MIGEL Coker pt had a RRT while going for CT of the chest, pt emergently brought back to ER [place] bed 4, pt intubated at 19:06  19:03 RSI meds given:  19:03 10mg etomidate   19:03 100 mg rocuronium   19:04 10mg etomidate   19:06 pt is intubated by MD Padmini; 7.5 tube and 21 at the lip  endorsed to [facility] ED Hold/ ICU RN, all RSI meds given by MD Padmini, pending order for RSI meds

## 2021-06-16 NOTE — ED ADULT NURSE REASSESSMENT NOTE - NS ED NURSE REASSESS COMMENT FT1
16:52 pt placed on BiPAP, explained the need as his oxygen saturation is 93-94% on nasal cannula and tachycardic in the range of 110-130's. Pt has received 6 nebs, steroids, magnesium yet pt still finds it hard to breath, MD Padmini requested to bedside, education done by myself, MD Padmini and Respiratory therapist. DEJAN Blandon made aware of pt status and that his overall health is not improving but deteriorating. ICU Consulted for the second time  18:30- pt does not want to be on BiPAP, pt placed on nonrebreather as he finds it more tolerable for the CT scan. ICU team at bedside along with myself, MD Padmini and respiratory therapist. pt oxygen saturation at this time is 100% on the nonrebreather  18:45- Rapid response called by CT scan, plan for pt to be intubated in ED [facility] bed #4

## 2021-06-17 LAB
ALBUMIN SERPL ELPH-MCNC: 3.1 G/DL — LOW (ref 3.3–5)
ALBUMIN SERPL ELPH-MCNC: 3.5 G/DL — SIGNIFICANT CHANGE UP (ref 3.3–5)
ALBUMIN SERPL ELPH-MCNC: 3.5 G/DL — SIGNIFICANT CHANGE UP (ref 3.3–5)
ALP SERPL-CCNC: 83 U/L — SIGNIFICANT CHANGE UP (ref 40–120)
ALP SERPL-CCNC: 86 U/L — SIGNIFICANT CHANGE UP (ref 40–120)
ALP SERPL-CCNC: 90 U/L — SIGNIFICANT CHANGE UP (ref 40–120)
ALT FLD-CCNC: 37 U/L — SIGNIFICANT CHANGE UP (ref 12–78)
ALT FLD-CCNC: 44 U/L — SIGNIFICANT CHANGE UP (ref 12–78)
ALT FLD-CCNC: 50 U/L — SIGNIFICANT CHANGE UP (ref 12–78)
AMPHET UR-MCNC: NEGATIVE — SIGNIFICANT CHANGE UP
ANION GAP SERPL CALC-SCNC: 6 MMOL/L — SIGNIFICANT CHANGE UP (ref 5–17)
ANION GAP SERPL CALC-SCNC: 6 MMOL/L — SIGNIFICANT CHANGE UP (ref 5–17)
ANION GAP SERPL CALC-SCNC: 8 MMOL/L — SIGNIFICANT CHANGE UP (ref 5–17)
ANION GAP SERPL CALC-SCNC: 8 MMOL/L — SIGNIFICANT CHANGE UP (ref 5–17)
APPEARANCE UR: CLEAR — SIGNIFICANT CHANGE UP
AST SERPL-CCNC: 13 U/L — LOW (ref 15–37)
AST SERPL-CCNC: 16 U/L — SIGNIFICANT CHANGE UP (ref 15–37)
AST SERPL-CCNC: 19 U/L — SIGNIFICANT CHANGE UP (ref 15–37)
BARBITURATES UR SCN-MCNC: NEGATIVE — SIGNIFICANT CHANGE UP
BASE EXCESS BLDA CALC-SCNC: -2.7 MMOL/L — LOW (ref -2–2)
BASE EXCESS BLDA CALC-SCNC: -4.8 MMOL/L — LOW (ref -2–2)
BASE EXCESS BLDA CALC-SCNC: 3.2 MMOL/L — HIGH (ref -2–2)
BENZODIAZ UR-MCNC: NEGATIVE — SIGNIFICANT CHANGE UP
BILIRUB SERPL-MCNC: 0.3 MG/DL — SIGNIFICANT CHANGE UP (ref 0.2–1.2)
BILIRUB SERPL-MCNC: 0.3 MG/DL — SIGNIFICANT CHANGE UP (ref 0.2–1.2)
BILIRUB SERPL-MCNC: 0.4 MG/DL — SIGNIFICANT CHANGE UP (ref 0.2–1.2)
BILIRUB UR-MCNC: NEGATIVE — SIGNIFICANT CHANGE UP
BLOOD GAS COMMENTS: SIGNIFICANT CHANGE UP
BLOOD GAS SOURCE: SIGNIFICANT CHANGE UP
BUN SERPL-MCNC: 12 MG/DL — SIGNIFICANT CHANGE UP (ref 7–23)
BUN SERPL-MCNC: 13 MG/DL — SIGNIFICANT CHANGE UP (ref 7–23)
CALCIUM SERPL-MCNC: 7.4 MG/DL — LOW (ref 8.5–10.1)
CALCIUM SERPL-MCNC: 7.7 MG/DL — LOW (ref 8.5–10.1)
CALCIUM SERPL-MCNC: 7.7 MG/DL — LOW (ref 8.5–10.1)
CALCIUM SERPL-MCNC: 8.4 MG/DL — LOW (ref 8.5–10.1)
CHLORIDE SERPL-SCNC: 105 MMOL/L — SIGNIFICANT CHANGE UP (ref 96–108)
CHLORIDE SERPL-SCNC: 107 MMOL/L — SIGNIFICANT CHANGE UP (ref 96–108)
CHLORIDE SERPL-SCNC: 107 MMOL/L — SIGNIFICANT CHANGE UP (ref 96–108)
CHLORIDE SERPL-SCNC: 111 MMOL/L — HIGH (ref 96–108)
CO2 SERPL-SCNC: 21 MMOL/L — LOW (ref 22–31)
CO2 SERPL-SCNC: 21 MMOL/L — LOW (ref 22–31)
CO2 SERPL-SCNC: 22 MMOL/L — SIGNIFICANT CHANGE UP (ref 22–31)
CO2 SERPL-SCNC: 28 MMOL/L — SIGNIFICANT CHANGE UP (ref 22–31)
COCAINE METAB.OTHER UR-MCNC: NEGATIVE — SIGNIFICANT CHANGE UP
COLOR SPEC: YELLOW — SIGNIFICANT CHANGE UP
COVID-19 SPIKE DOMAIN AB INTERP: POSITIVE
COVID-19 SPIKE DOMAIN ANTIBODY RESULT: 143 U/ML — HIGH
CREAT SERPL-MCNC: 1.19 MG/DL — SIGNIFICANT CHANGE UP (ref 0.5–1.3)
CREAT SERPL-MCNC: 1.22 MG/DL — SIGNIFICANT CHANGE UP (ref 0.5–1.3)
CREAT SERPL-MCNC: 1.35 MG/DL — HIGH (ref 0.5–1.3)
CREAT SERPL-MCNC: 1.46 MG/DL — HIGH (ref 0.5–1.3)
DIFF PNL FLD: NEGATIVE — SIGNIFICANT CHANGE UP
GLUCOSE BLDC GLUCOMTR-MCNC: 104 MG/DL — HIGH (ref 70–99)
GLUCOSE BLDC GLUCOMTR-MCNC: 118 MG/DL — HIGH (ref 70–99)
GLUCOSE BLDC GLUCOMTR-MCNC: 120 MG/DL — HIGH (ref 70–99)
GLUCOSE BLDC GLUCOMTR-MCNC: 178 MG/DL — HIGH (ref 70–99)
GLUCOSE BLDC GLUCOMTR-MCNC: 183 MG/DL — HIGH (ref 70–99)
GLUCOSE BLDC GLUCOMTR-MCNC: 244 MG/DL — HIGH (ref 70–99)
GLUCOSE SERPL-MCNC: 162 MG/DL — HIGH (ref 70–99)
GLUCOSE SERPL-MCNC: 174 MG/DL — HIGH (ref 70–99)
GLUCOSE SERPL-MCNC: 192 MG/DL — HIGH (ref 70–99)
GLUCOSE SERPL-MCNC: 198 MG/DL — HIGH (ref 70–99)
GLUCOSE UR QL: 50 MG/DL
HCO3 BLDA-SCNC: 24 MMOL/L — SIGNIFICANT CHANGE UP (ref 21–29)
HCO3 BLDA-SCNC: 25 MMOL/L — SIGNIFICANT CHANGE UP (ref 21–29)
HCO3 BLDA-SCNC: 30 MMOL/L — HIGH (ref 21–29)
HOROWITZ INDEX BLDA+IHG-RTO: 70 — SIGNIFICANT CHANGE UP
KETONES UR-MCNC: NEGATIVE — SIGNIFICANT CHANGE UP
LEUKOCYTE ESTERASE UR-ACNC: NEGATIVE — SIGNIFICANT CHANGE UP
MAGNESIUM SERPL-MCNC: 2.4 MG/DL — SIGNIFICANT CHANGE UP (ref 1.6–2.6)
METHADONE UR-MCNC: NEGATIVE — SIGNIFICANT CHANGE UP
NITRITE UR-MCNC: NEGATIVE — SIGNIFICANT CHANGE UP
OPIATES UR-MCNC: NEGATIVE — SIGNIFICANT CHANGE UP
PCO2 BLDA: 55 MMHG — HIGH (ref 32–46)
PCO2 BLDA: 56 MMHG — HIGH (ref 32–46)
PCO2 BLDA: 60 MMHG — HIGH (ref 32–46)
PCP SPEC-MCNC: SIGNIFICANT CHANGE UP
PCP UR-MCNC: NEGATIVE — SIGNIFICANT CHANGE UP
PH BLD: 7.22 — LOW (ref 7.35–7.45)
PH BLD: 7.27 — LOW (ref 7.35–7.45)
PH BLD: 7.35 — SIGNIFICANT CHANGE UP (ref 7.35–7.45)
PH UR: 6.5 — SIGNIFICANT CHANGE UP (ref 5–8)
PHOSPHATE SERPL-MCNC: 3.1 MG/DL — SIGNIFICANT CHANGE UP (ref 2.5–4.5)
PO2 BLDA: 76 MMHG — SIGNIFICANT CHANGE UP (ref 74–108)
PO2 BLDA: 77 MMHG — SIGNIFICANT CHANGE UP (ref 74–108)
PO2 BLDA: 81 MMHG — SIGNIFICANT CHANGE UP (ref 74–108)
POTASSIUM SERPL-MCNC: 4.4 MMOL/L — SIGNIFICANT CHANGE UP (ref 3.5–5.3)
POTASSIUM SERPL-MCNC: 5.5 MMOL/L — HIGH (ref 3.5–5.3)
POTASSIUM SERPL-MCNC: 6.7 MMOL/L — CRITICAL HIGH (ref 3.5–5.3)
POTASSIUM SERPL-MCNC: 7 MMOL/L — CRITICAL HIGH (ref 3.5–5.3)
POTASSIUM SERPL-SCNC: 4.4 MMOL/L — SIGNIFICANT CHANGE UP (ref 3.5–5.3)
POTASSIUM SERPL-SCNC: 5.5 MMOL/L — HIGH (ref 3.5–5.3)
POTASSIUM SERPL-SCNC: 6.7 MMOL/L — CRITICAL HIGH (ref 3.5–5.3)
POTASSIUM SERPL-SCNC: 7 MMOL/L — CRITICAL HIGH (ref 3.5–5.3)
PROCALCITONIN SERPL-MCNC: 0.68 NG/ML — HIGH (ref 0.02–0.1)
PROT SERPL-MCNC: 7.2 GM/DL — SIGNIFICANT CHANGE UP (ref 6–8.3)
PROT SERPL-MCNC: 7.4 GM/DL — SIGNIFICANT CHANGE UP (ref 6–8.3)
PROT SERPL-MCNC: 7.5 GM/DL — SIGNIFICANT CHANGE UP (ref 6–8.3)
PROT UR-MCNC: NEGATIVE MG/DL — SIGNIFICANT CHANGE UP
RAPID RVP RESULT: DETECTED
RV+EV RNA SPEC QL NAA+PROBE: DETECTED
SAO2 % BLDA: 95 % — SIGNIFICANT CHANGE UP (ref 92–96)
SAO2 % BLDA: 95 % — SIGNIFICANT CHANGE UP (ref 92–96)
SAO2 % BLDA: 96 % — SIGNIFICANT CHANGE UP (ref 92–96)
SARS-COV-2 IGG+IGM SERPL QL IA: 143 U/ML — HIGH
SARS-COV-2 IGG+IGM SERPL QL IA: POSITIVE
SARS-COV-2 RNA SPEC QL NAA+PROBE: SIGNIFICANT CHANGE UP
SODIUM SERPL-SCNC: 135 MMOL/L — SIGNIFICANT CHANGE UP (ref 135–145)
SODIUM SERPL-SCNC: 136 MMOL/L — SIGNIFICANT CHANGE UP (ref 135–145)
SODIUM SERPL-SCNC: 138 MMOL/L — SIGNIFICANT CHANGE UP (ref 135–145)
SODIUM SERPL-SCNC: 141 MMOL/L — SIGNIFICANT CHANGE UP (ref 135–145)
SP GR SPEC: 1.01 — SIGNIFICANT CHANGE UP (ref 1.01–1.02)
THC UR QL: NEGATIVE — SIGNIFICANT CHANGE UP
UROBILINOGEN FLD QL: NEGATIVE MG/DL — SIGNIFICANT CHANGE UP

## 2021-06-17 PROCEDURE — 99291 CRITICAL CARE FIRST HOUR: CPT | Mod: 25

## 2021-06-17 PROCEDURE — 71045 X-RAY EXAM CHEST 1 VIEW: CPT | Mod: 26

## 2021-06-17 PROCEDURE — 43753 TX GASTRO INTUB W/ASP: CPT | Mod: 59

## 2021-06-17 PROCEDURE — 71045 X-RAY EXAM CHEST 1 VIEW: CPT | Mod: 26,77

## 2021-06-17 RX ORDER — DEXTROSE 50 % IN WATER 50 %
25 SYRINGE (ML) INTRAVENOUS ONCE
Refills: 0 | Status: DISCONTINUED | OUTPATIENT
Start: 2021-06-17 | End: 2021-06-18

## 2021-06-17 RX ORDER — ALBUTEROL 90 UG/1
2.5 AEROSOL, METERED ORAL ONCE
Refills: 0 | Status: COMPLETED | OUTPATIENT
Start: 2021-06-17 | End: 2021-06-17

## 2021-06-17 RX ORDER — FENTANYL CITRATE 50 UG/ML
100 INJECTION INTRAVENOUS ONCE
Refills: 0 | Status: DISCONTINUED | OUTPATIENT
Start: 2021-06-17 | End: 2021-06-17

## 2021-06-17 RX ORDER — ALBUTEROL 90 UG/1
2.5 AEROSOL, METERED ORAL
Refills: 0 | Status: DISCONTINUED | OUTPATIENT
Start: 2021-06-17 | End: 2021-06-17

## 2021-06-17 RX ORDER — DEXTROSE 50 % IN WATER 50 %
50 SYRINGE (ML) INTRAVENOUS ONCE
Refills: 0 | Status: COMPLETED | OUTPATIENT
Start: 2021-06-17 | End: 2021-06-17

## 2021-06-17 RX ORDER — SODIUM CHLORIDE 9 MG/ML
1000 INJECTION, SOLUTION INTRAVENOUS
Refills: 0 | Status: COMPLETED | OUTPATIENT
Start: 2021-06-17 | End: 2021-06-17

## 2021-06-17 RX ORDER — INSULIN LISPRO 100/ML
VIAL (ML) SUBCUTANEOUS EVERY 6 HOURS
Refills: 0 | Status: DISCONTINUED | OUTPATIENT
Start: 2021-06-17 | End: 2021-06-18

## 2021-06-17 RX ORDER — ALBUTEROL 90 UG/1
2.5 AEROSOL, METERED ORAL EVERY 6 HOURS
Refills: 0 | Status: DISCONTINUED | OUTPATIENT
Start: 2021-06-17 | End: 2021-06-17

## 2021-06-17 RX ORDER — CEFTRIAXONE 500 MG/1
INJECTION, POWDER, FOR SOLUTION INTRAMUSCULAR; INTRAVENOUS
Refills: 0 | Status: COMPLETED | OUTPATIENT
Start: 2021-06-17 | End: 2021-06-23

## 2021-06-17 RX ORDER — CHLORHEXIDINE GLUCONATE 213 G/1000ML
15 SOLUTION TOPICAL EVERY 12 HOURS
Refills: 0 | Status: DISCONTINUED | OUTPATIENT
Start: 2021-06-17 | End: 2021-06-20

## 2021-06-17 RX ORDER — SODIUM CHLORIDE 9 MG/ML
1000 INJECTION, SOLUTION INTRAVENOUS
Refills: 0 | Status: DISCONTINUED | OUTPATIENT
Start: 2021-06-17 | End: 2021-06-18

## 2021-06-17 RX ORDER — CALCIUM GLUCONATE 100 MG/ML
2 VIAL (ML) INTRAVENOUS ONCE
Refills: 0 | Status: DISCONTINUED | OUTPATIENT
Start: 2021-06-17 | End: 2021-06-17

## 2021-06-17 RX ORDER — ALBUTEROL 90 UG/1
2.5 AEROSOL, METERED ORAL EVERY 4 HOURS
Refills: 0 | Status: DISCONTINUED | OUTPATIENT
Start: 2021-06-17 | End: 2021-06-17

## 2021-06-17 RX ORDER — AZITHROMYCIN 500 MG/1
500 TABLET, FILM COATED ORAL EVERY 24 HOURS
Refills: 0 | Status: COMPLETED | OUTPATIENT
Start: 2021-06-17 | End: 2021-06-21

## 2021-06-17 RX ORDER — CEFTRIAXONE 500 MG/1
1000 INJECTION, POWDER, FOR SOLUTION INTRAMUSCULAR; INTRAVENOUS ONCE
Refills: 0 | Status: COMPLETED | OUTPATIENT
Start: 2021-06-17 | End: 2021-06-17

## 2021-06-17 RX ORDER — CEFTRIAXONE 500 MG/1
1000 INJECTION, POWDER, FOR SOLUTION INTRAMUSCULAR; INTRAVENOUS EVERY 24 HOURS
Refills: 0 | Status: COMPLETED | OUTPATIENT
Start: 2021-06-18 | End: 2021-06-22

## 2021-06-17 RX ORDER — DEXTROSE 50 % IN WATER 50 %
15 SYRINGE (ML) INTRAVENOUS ONCE
Refills: 0 | Status: DISCONTINUED | OUTPATIENT
Start: 2021-06-17 | End: 2021-06-18

## 2021-06-17 RX ORDER — DEXTROSE 50 % IN WATER 50 %
12.5 SYRINGE (ML) INTRAVENOUS ONCE
Refills: 0 | Status: DISCONTINUED | OUTPATIENT
Start: 2021-06-17 | End: 2021-06-18

## 2021-06-17 RX ORDER — INSULIN HUMAN 100 [IU]/ML
10 INJECTION, SOLUTION SUBCUTANEOUS ONCE
Refills: 0 | Status: COMPLETED | OUTPATIENT
Start: 2021-06-17 | End: 2021-06-17

## 2021-06-17 RX ORDER — IPRATROPIUM/ALBUTEROL SULFATE 18-103MCG
3 AEROSOL WITH ADAPTER (GRAM) INHALATION EVERY 4 HOURS
Refills: 0 | Status: DISCONTINUED | OUTPATIENT
Start: 2021-06-17 | End: 2021-06-21

## 2021-06-17 RX ORDER — SODIUM CHLORIDE 9 MG/ML
3 INJECTION INTRAMUSCULAR; INTRAVENOUS; SUBCUTANEOUS EVERY 4 HOURS
Refills: 0 | Status: DISCONTINUED | OUTPATIENT
Start: 2021-06-17 | End: 2021-06-19

## 2021-06-17 RX ORDER — INSULIN HUMAN 100 [IU]/ML
5 INJECTION, SOLUTION SUBCUTANEOUS ONCE
Refills: 0 | Status: COMPLETED | OUTPATIENT
Start: 2021-06-17 | End: 2021-06-17

## 2021-06-17 RX ORDER — SODIUM ZIRCONIUM CYCLOSILICATE 10 G/10G
5 POWDER, FOR SUSPENSION ORAL EVERY 8 HOURS
Refills: 0 | Status: DISCONTINUED | OUTPATIENT
Start: 2021-06-17 | End: 2021-06-17

## 2021-06-17 RX ORDER — FUROSEMIDE 40 MG
20 TABLET ORAL ONCE
Refills: 0 | Status: COMPLETED | OUTPATIENT
Start: 2021-06-17 | End: 2021-06-17

## 2021-06-17 RX ORDER — GLUCAGON INJECTION, SOLUTION 0.5 MG/.1ML
1 INJECTION, SOLUTION SUBCUTANEOUS ONCE
Refills: 0 | Status: DISCONTINUED | OUTPATIENT
Start: 2021-06-17 | End: 2021-06-23

## 2021-06-17 RX ORDER — ALBUTEROL 90 UG/1
2.5 AEROSOL, METERED ORAL ONCE
Refills: 0 | Status: DISCONTINUED | OUTPATIENT
Start: 2021-06-17 | End: 2021-06-17

## 2021-06-17 RX ORDER — ALBUTEROL 90 UG/1
2.5 AEROSOL, METERED ORAL
Refills: 0 | Status: DISCONTINUED | OUTPATIENT
Start: 2021-06-17 | End: 2021-06-22

## 2021-06-17 RX ADMIN — DEXMEDETOMIDINE HYDROCHLORIDE IN 0.9% SODIUM CHLORIDE 4.77 MICROGRAM(S)/KG/HR: 4 INJECTION INTRAVENOUS at 21:28

## 2021-06-17 RX ADMIN — CEFTRIAXONE 100 MILLIGRAM(S): 500 INJECTION, POWDER, FOR SOLUTION INTRAMUSCULAR; INTRAVENOUS at 02:50

## 2021-06-17 RX ADMIN — ALBUTEROL 2.5 MILLIGRAM(S): 90 AEROSOL, METERED ORAL at 00:23

## 2021-06-17 RX ADMIN — KETAMINE HYDROCHLORIDE 2.38 MG/KG/HR: 100 INJECTION INTRAMUSCULAR; INTRAVENOUS at 00:25

## 2021-06-17 RX ADMIN — CHLORHEXIDINE GLUCONATE 1 APPLICATION(S): 213 SOLUTION TOPICAL at 05:27

## 2021-06-17 RX ADMIN — ALBUTEROL 2.5 MILLIGRAM(S): 90 AEROSOL, METERED ORAL at 06:00

## 2021-06-17 RX ADMIN — Medication 40 MILLIGRAM(S): at 00:22

## 2021-06-17 RX ADMIN — DEXMEDETOMIDINE HYDROCHLORIDE IN 0.9% SODIUM CHLORIDE 4.77 MICROGRAM(S)/KG/HR: 4 INJECTION INTRAVENOUS at 00:23

## 2021-06-17 RX ADMIN — CHLORHEXIDINE GLUCONATE 15 MILLILITER(S): 213 SOLUTION TOPICAL at 05:26

## 2021-06-17 RX ADMIN — Medication 3 MILLILITER(S): at 11:39

## 2021-06-17 RX ADMIN — PROPOFOL 5.72 MICROGRAM(S)/KG/MIN: 10 INJECTION, EMULSION INTRAVENOUS at 02:49

## 2021-06-17 RX ADMIN — INSULIN HUMAN 5 UNIT(S): 100 INJECTION, SOLUTION SUBCUTANEOUS at 05:47

## 2021-06-17 RX ADMIN — Medication 40 MILLIGRAM(S): at 05:26

## 2021-06-17 RX ADMIN — Medication 20 MILLIGRAM(S): at 01:51

## 2021-06-17 RX ADMIN — KETAMINE HYDROCHLORIDE 2.38 MG/KG/HR: 100 INJECTION INTRAMUSCULAR; INTRAVENOUS at 13:48

## 2021-06-17 RX ADMIN — SODIUM CHLORIDE 3 MILLILITER(S): 9 INJECTION INTRAMUSCULAR; INTRAVENOUS; SUBCUTANEOUS at 21:19

## 2021-06-17 RX ADMIN — DEXMEDETOMIDINE HYDROCHLORIDE IN 0.9% SODIUM CHLORIDE 4.77 MICROGRAM(S)/KG/HR: 4 INJECTION INTRAVENOUS at 18:22

## 2021-06-17 RX ADMIN — FENTANYL CITRATE 100 MICROGRAM(S): 50 INJECTION INTRAVENOUS at 21:05

## 2021-06-17 RX ADMIN — Medication 50 MILLILITER(S): at 05:46

## 2021-06-17 RX ADMIN — Medication 40 MILLIGRAM(S): at 17:34

## 2021-06-17 RX ADMIN — PROPOFOL 5.72 MICROGRAM(S)/KG/MIN: 10 INJECTION, EMULSION INTRAVENOUS at 11:48

## 2021-06-17 RX ADMIN — Medication 50 MILLILITER(S): at 01:48

## 2021-06-17 RX ADMIN — CHLORHEXIDINE GLUCONATE 15 MILLILITER(S): 213 SOLUTION TOPICAL at 17:34

## 2021-06-17 RX ADMIN — INSULIN HUMAN 10 UNIT(S): 100 INJECTION, SOLUTION SUBCUTANEOUS at 01:47

## 2021-06-17 RX ADMIN — Medication 3 MILLILITER(S): at 21:18

## 2021-06-17 RX ADMIN — ALBUTEROL 2.5 MILLIGRAM(S): 90 AEROSOL, METERED ORAL at 04:33

## 2021-06-17 RX ADMIN — KETAMINE HYDROCHLORIDE 2.38 MG/KG/HR: 100 INJECTION INTRAMUSCULAR; INTRAVENOUS at 07:35

## 2021-06-17 RX ADMIN — Medication 40 MILLIGRAM(S): at 11:48

## 2021-06-17 RX ADMIN — HEPARIN SODIUM 5000 UNIT(S): 5000 INJECTION INTRAVENOUS; SUBCUTANEOUS at 05:26

## 2021-06-17 RX ADMIN — Medication 3 MILLILITER(S): at 17:12

## 2021-06-17 RX ADMIN — KETAMINE HYDROCHLORIDE 2.38 MG/KG/HR: 100 INJECTION INTRAMUSCULAR; INTRAVENOUS at 00:23

## 2021-06-17 RX ADMIN — DEXMEDETOMIDINE HYDROCHLORIDE IN 0.9% SODIUM CHLORIDE 4.77 MICROGRAM(S)/KG/HR: 4 INJECTION INTRAVENOUS at 02:49

## 2021-06-17 RX ADMIN — ALBUTEROL 2.5 MILLIGRAM(S): 90 AEROSOL, METERED ORAL at 02:12

## 2021-06-17 RX ADMIN — DEXMEDETOMIDINE HYDROCHLORIDE IN 0.9% SODIUM CHLORIDE 4.77 MICROGRAM(S)/KG/HR: 4 INJECTION INTRAVENOUS at 09:29

## 2021-06-17 RX ADMIN — PROPOFOL 5.72 MICROGRAM(S)/KG/MIN: 10 INJECTION, EMULSION INTRAVENOUS at 21:38

## 2021-06-17 RX ADMIN — SODIUM CHLORIDE 100 MILLILITER(S): 9 INJECTION, SOLUTION INTRAVENOUS at 02:49

## 2021-06-17 RX ADMIN — DEXMEDETOMIDINE HYDROCHLORIDE IN 0.9% SODIUM CHLORIDE 4.77 MICROGRAM(S)/KG/HR: 4 INJECTION INTRAVENOUS at 07:35

## 2021-06-17 RX ADMIN — AZITHROMYCIN 255 MILLIGRAM(S): 500 TABLET, FILM COATED ORAL at 17:34

## 2021-06-17 RX ADMIN — HEPARIN SODIUM 5000 UNIT(S): 5000 INJECTION INTRAVENOUS; SUBCUTANEOUS at 16:24

## 2021-06-17 RX ADMIN — DEXMEDETOMIDINE HYDROCHLORIDE IN 0.9% SODIUM CHLORIDE 4.77 MICROGRAM(S)/KG/HR: 4 INJECTION INTRAVENOUS at 12:21

## 2021-06-17 RX ADMIN — CISATRACURIUM BESYLATE 17.2 MICROGRAM(S)/KG/MIN: 2 INJECTION INTRAVENOUS at 00:24

## 2021-06-17 RX ADMIN — PROPOFOL 5.72 MICROGRAM(S)/KG/MIN: 10 INJECTION, EMULSION INTRAVENOUS at 00:24

## 2021-06-17 RX ADMIN — KETAMINE HYDROCHLORIDE 2.38 MG/KG/HR: 100 INJECTION INTRAMUSCULAR; INTRAVENOUS at 18:41

## 2021-06-17 RX ADMIN — HEPARIN SODIUM 5000 UNIT(S): 5000 INJECTION INTRAVENOUS; SUBCUTANEOUS at 21:37

## 2021-06-17 NOTE — PROCEDURE NOTE - ADDITIONAL PROCEDURE DETAILS
NGT inserted through R nare. Overnight, multiple staff members attempted NGT insertion without success. NGT inserted by myself today for initiation of tube feeds and meds while on mechanical ventilator

## 2021-06-17 NOTE — PROCEDURE NOTE - NSPROCDETAILS_GEN_ALL_CORE
nasogastric/audible air bolus/placement confirmed by auscultation/gastric secretions aspirated, placement confirmed/bowel sounds present to 4 quadrants

## 2021-06-17 NOTE — PROGRESS NOTE ADULT - SUBJECTIVE AND OBJECTIVE BOX
24 hr events:  remains intubated for asthma exacerbation  weaned off paralytics today  remains on 3 IV sedatives  still with some mild wheezing  CXR with small L PTX and bilateral upper lobe consolidative pattern    ## ROS:  [x ] unable to obtain due to intubation/sedation       ## Labs:  CBC:                        15.2   17.40 )-----------( 241      ( 16 Jun 2021 23:47 )             47.9     Chem:  06-17    141  |  107  |  12  ----------------------------<  198<H>  4.4   |  28  |  1.19    Ca    8.4<L>      17 Jun 2021 08:43  Phos  3.1     06-17  Mg     2.4     06-17    TPro  7.2  /  Alb  3.1<L>  /  TBili  0.3  /  DBili  x   /  AST  16  /  ALT  37  /  AlkPhos  83  06-17    Respiratory Viral Panel with COVID-19 by MORENO (06.16.21 @ 22:49)    Rapid RVP Result: Detected    SARS-CoV-2: [facility]    Entero/Rhinovirus (RapRVP): Detected        ## Imaging:  CXR < from: Xray Chest 1 View- PORTABLE-Urgent (Xray Chest 1 View- PORTABLE-Urgent .) (06.17.21 @ 11:15) >  Residual 5% LEFT apical pneumothorax.  ET tube tip above tracheal bifurcation.  NG tube tip beyond GE junction.  Bilateral upper lobe parenchymal opacities        ## Medications:  azithromycin  IVPB 500 milliGRAM(s) IV Intermittent every 24 hours  cefTRIAXone   IVPB          ALBUTerol    0.083% 2.5 milliGRAM(s) Nebulizer every 2 hours PRN  albuterol/ipratropium for Nebulization 3 milliLiter(s) Nebulizer every 4 hours  sodium chloride 3%  Inhalation 3 milliLiter(s) Inhalation every 4 hours    dextrose 40% Gel 15 Gram(s) Oral once  dextrose 50% Injectable 25 Gram(s) IV Push once  dextrose 50% Injectable 12.5 Gram(s) IV Push once  dextrose 50% Injectable 25 Gram(s) IV Push once  glucagon  Injectable 1 milliGRAM(s) IntraMuscular once  insulin lispro (ADMELOG) corrective regimen sliding scale   SubCutaneous every 6 hours  methylPREDNISolone sodium succinate Injectable 40 milliGRAM(s) IV Push every 6 hours    heparin   Injectable 5000 Unit(s) SubCutaneous every 8 hours      cisatracurium Infusion 3 MICROgram(s)/kG/Min IV Continuous <Continuous>  dexMEDEtomidine Infusion 0.2 MICROgram(s)/kG/Hr IV Continuous <Continuous>  ketamine Infusion. 0.25 mG/kG/Hr IV Continuous <Continuous>  propofol Infusion 10 MICROgram(s)/kG/Min IV Continuous <Continuous>      ## Vitals:  T(C): 36.9 (06-17-21 @ 19:12), Max: 37.7 (06-17-21 @ 01:00)  HR: 116 (06-17-21 @ 21:21) (104 - 140)  BP: 109/57 (06-17-21 @ 20:00) (92/44 - 135/93)  BP(mean): 71 (06-17-21 @ 20:00) (58 - 107)  RR: 17 (06-17-21 @ 20:00) (14 - 28)  SpO2: 98% (06-17-21 @ 21:21) (90% - 100%)    Vent: Mode: AC/ CMV (Assist Control/ Continuous Mandatory Ventilation), RR (machine): 12, RR (patient): 16, TV (machine): 550, FiO2: 65, PEEP: 5, PC: 18, PIP: 38    ABG: ABG - ( 17 Jun 2021 08:25 )  pH, Arterial: x     pH, Blood: 7.35  /  pCO2: 56    /  pO2: 77    / HCO3: 30    / Base Excess: 3.2   /  SaO2: 96            06-16 @ 07:01  -  06-17 @ 07:00  --------------------------------------------------------  IN: 1887.3 mL / OUT: 2185 mL / NET: -297.7 mL    06-17 @ 07:01  -  06-17 @ 21:34  --------------------------------------------------------  IN: 586.4 mL / OUT: 1935 mL / NET: -1348.6 mL          ## P/E:  Gen: lying comfortably in bed in no apparent distress, orally intubated sedated  HEENT: PERRL, ETT, NGT in place  Resp: bilateral wheeze predominantly upper lung fields    CVS: RRR  Abd: soft NT/ND +BS  Ext: no c/c/e  Neuro: sedated    CENTRAL LINE: [ ] YES [ x] NO  LOCATION:   DATE INSERTED:  REMOVE: [ ] YES [ ] NO      RINALDI: [x ] YES [ ] NO    DATE INSERTED:  REMOVE:  [ ] YES [ ] NO      A-LINE:  [ ] YES [x ] NO  LOCATION:   DATE INSERTED:  REMOVE:  [ ] YES [ ] NO  EXPLAIN:      CODE STATUS: [x ] full code  [ ] DNR  [ ] DNI  [ ] MOLST  Goals of care discussion: [x ] yes

## 2021-06-18 LAB
A1C WITH ESTIMATED AVERAGE GLUCOSE RESULT: 5.4 % — SIGNIFICANT CHANGE UP (ref 4–5.6)
ALBUMIN SERPL ELPH-MCNC: 3.1 G/DL — LOW (ref 3.3–5)
ALP SERPL-CCNC: 72 U/L — SIGNIFICANT CHANGE UP (ref 40–120)
ALT FLD-CCNC: 32 U/L — SIGNIFICANT CHANGE UP (ref 12–78)
ANION GAP SERPL CALC-SCNC: 6 MMOL/L — SIGNIFICANT CHANGE UP (ref 5–17)
AST SERPL-CCNC: 35 U/L — SIGNIFICANT CHANGE UP (ref 15–37)
BASOPHILS # BLD AUTO: 0.01 K/UL — SIGNIFICANT CHANGE UP (ref 0–0.2)
BASOPHILS NFR BLD AUTO: 0.1 % — SIGNIFICANT CHANGE UP (ref 0–2)
BILIRUB SERPL-MCNC: 0.2 MG/DL — SIGNIFICANT CHANGE UP (ref 0.2–1.2)
BUN SERPL-MCNC: 17 MG/DL — SIGNIFICANT CHANGE UP (ref 7–23)
CALCIUM SERPL-MCNC: 8.5 MG/DL — SIGNIFICANT CHANGE UP (ref 8.5–10.1)
CHLORIDE SERPL-SCNC: 109 MMOL/L — HIGH (ref 96–108)
CO2 SERPL-SCNC: 32 MMOL/L — HIGH (ref 22–31)
CREAT SERPL-MCNC: 1.25 MG/DL — SIGNIFICANT CHANGE UP (ref 0.5–1.3)
CULTURE RESULTS: NO GROWTH — SIGNIFICANT CHANGE UP
EOSINOPHIL # BLD AUTO: 0 K/UL — SIGNIFICANT CHANGE UP (ref 0–0.5)
EOSINOPHIL NFR BLD AUTO: 0 % — SIGNIFICANT CHANGE UP (ref 0–6)
ESTIMATED AVERAGE GLUCOSE: 108 MG/DL — SIGNIFICANT CHANGE UP (ref 68–114)
GLUCOSE BLDC GLUCOMTR-MCNC: 109 MG/DL — HIGH (ref 70–99)
GLUCOSE BLDC GLUCOMTR-MCNC: 124 MG/DL — HIGH (ref 70–99)
GLUCOSE SERPL-MCNC: 129 MG/DL — HIGH (ref 70–99)
HCT VFR BLD CALC: 45 % — SIGNIFICANT CHANGE UP (ref 39–50)
HGB BLD-MCNC: 14.3 G/DL — SIGNIFICANT CHANGE UP (ref 13–17)
IMM GRANULOCYTES NFR BLD AUTO: 0.4 % — SIGNIFICANT CHANGE UP (ref 0–1.5)
LYMPHOCYTES # BLD AUTO: 0.6 K/UL — LOW (ref 1–3.3)
LYMPHOCYTES # BLD AUTO: 4 % — LOW (ref 13–44)
MAGNESIUM SERPL-MCNC: 2.6 MG/DL — SIGNIFICANT CHANGE UP (ref 1.6–2.6)
MCHC RBC-ENTMCNC: 27.7 PG — SIGNIFICANT CHANGE UP (ref 27–34)
MCHC RBC-ENTMCNC: 31.8 GM/DL — LOW (ref 32–36)
MCV RBC AUTO: 87.2 FL — SIGNIFICANT CHANGE UP (ref 80–100)
MONOCYTES # BLD AUTO: 0.87 K/UL — SIGNIFICANT CHANGE UP (ref 0–0.9)
MONOCYTES NFR BLD AUTO: 5.7 % — SIGNIFICANT CHANGE UP (ref 2–14)
NEUTROPHILS # BLD AUTO: 13.62 K/UL — HIGH (ref 1.8–7.4)
NEUTROPHILS NFR BLD AUTO: 89.8 % — HIGH (ref 43–77)
NRBC # BLD: 0 /100 WBCS — SIGNIFICANT CHANGE UP (ref 0–0)
PHOSPHATE SERPL-MCNC: 4.4 MG/DL — SIGNIFICANT CHANGE UP (ref 2.5–4.5)
PLATELET # BLD AUTO: 212 K/UL — SIGNIFICANT CHANGE UP (ref 150–400)
POTASSIUM SERPL-MCNC: 4.5 MMOL/L — SIGNIFICANT CHANGE UP (ref 3.5–5.3)
POTASSIUM SERPL-SCNC: 4.5 MMOL/L — SIGNIFICANT CHANGE UP (ref 3.5–5.3)
PROT SERPL-MCNC: 6.6 GM/DL — SIGNIFICANT CHANGE UP (ref 6–8.3)
RBC # BLD: 5.16 M/UL — SIGNIFICANT CHANGE UP (ref 4.2–5.8)
RBC # FLD: 14.6 % — HIGH (ref 10.3–14.5)
SODIUM SERPL-SCNC: 147 MMOL/L — HIGH (ref 135–145)
SPECIMEN SOURCE: SIGNIFICANT CHANGE UP
WBC # BLD: 15.16 K/UL — HIGH (ref 3.8–10.5)
WBC # FLD AUTO: 15.16 K/UL — HIGH (ref 3.8–10.5)

## 2021-06-18 PROCEDURE — 99291 CRITICAL CARE FIRST HOUR: CPT

## 2021-06-18 PROCEDURE — 71045 X-RAY EXAM CHEST 1 VIEW: CPT | Mod: 26

## 2021-06-18 RX ORDER — DEXMEDETOMIDINE HYDROCHLORIDE IN 0.9% SODIUM CHLORIDE 4 UG/ML
0.2 INJECTION INTRAVENOUS
Qty: 200 | Refills: 0 | Status: DISCONTINUED | OUTPATIENT
Start: 2021-06-18 | End: 2021-06-20

## 2021-06-18 RX ORDER — FENTANYL CITRATE 50 UG/ML
50 INJECTION INTRAVENOUS ONCE
Refills: 0 | Status: DISCONTINUED | OUTPATIENT
Start: 2021-06-18 | End: 2021-06-18

## 2021-06-18 RX ORDER — ACETAMINOPHEN 500 MG
650 TABLET ORAL ONCE
Refills: 0 | Status: COMPLETED | OUTPATIENT
Start: 2021-06-18 | End: 2021-06-18

## 2021-06-18 RX ORDER — ENOXAPARIN SODIUM 100 MG/ML
40 INJECTION SUBCUTANEOUS
Refills: 0 | Status: DISCONTINUED | OUTPATIENT
Start: 2021-06-18 | End: 2021-06-23

## 2021-06-18 RX ORDER — SENNA PLUS 8.6 MG/1
2 TABLET ORAL AT BEDTIME
Refills: 0 | Status: DISCONTINUED | OUTPATIENT
Start: 2021-06-18 | End: 2021-06-23

## 2021-06-18 RX ORDER — FENTANYL CITRATE 50 UG/ML
0.5 INJECTION INTRAVENOUS
Qty: 2500 | Refills: 0 | Status: DISCONTINUED | OUTPATIENT
Start: 2021-06-18 | End: 2021-06-20

## 2021-06-18 RX ORDER — FENTANYL CITRATE 50 UG/ML
100 INJECTION INTRAVENOUS ONCE
Refills: 0 | Status: DISCONTINUED | OUTPATIENT
Start: 2021-06-18 | End: 2021-06-18

## 2021-06-18 RX ORDER — MIDAZOLAM HYDROCHLORIDE 1 MG/ML
4 INJECTION, SOLUTION INTRAMUSCULAR; INTRAVENOUS ONCE
Refills: 0 | Status: DISCONTINUED | OUTPATIENT
Start: 2021-06-18 | End: 2021-06-18

## 2021-06-18 RX ORDER — POLYETHYLENE GLYCOL 3350 17 G/17G
17 POWDER, FOR SOLUTION ORAL DAILY
Refills: 0 | Status: DISCONTINUED | OUTPATIENT
Start: 2021-06-18 | End: 2021-06-23

## 2021-06-18 RX ORDER — MIDAZOLAM HYDROCHLORIDE 1 MG/ML
2 INJECTION, SOLUTION INTRAMUSCULAR; INTRAVENOUS ONCE
Refills: 0 | Status: DISCONTINUED | OUTPATIENT
Start: 2021-06-18 | End: 2021-06-18

## 2021-06-18 RX ADMIN — FENTANYL CITRATE 100 MICROGRAM(S): 50 INJECTION INTRAVENOUS at 01:00

## 2021-06-18 RX ADMIN — Medication 3 MILLILITER(S): at 22:02

## 2021-06-18 RX ADMIN — PROPOFOL 5.72 MICROGRAM(S)/KG/MIN: 10 INJECTION, EMULSION INTRAVENOUS at 11:15

## 2021-06-18 RX ADMIN — KETAMINE HYDROCHLORIDE 2.38 MG/KG/HR: 100 INJECTION INTRAMUSCULAR; INTRAVENOUS at 22:26

## 2021-06-18 RX ADMIN — Medication 3 MILLILITER(S): at 17:29

## 2021-06-18 RX ADMIN — DEXMEDETOMIDINE HYDROCHLORIDE IN 0.9% SODIUM CHLORIDE 4.77 MICROGRAM(S)/KG/HR: 4 INJECTION INTRAVENOUS at 04:20

## 2021-06-18 RX ADMIN — Medication 3 MILLILITER(S): at 01:42

## 2021-06-18 RX ADMIN — AZITHROMYCIN 255 MILLIGRAM(S): 500 TABLET, FILM COATED ORAL at 17:52

## 2021-06-18 RX ADMIN — KETAMINE HYDROCHLORIDE 2.38 MG/KG/HR: 100 INJECTION INTRAMUSCULAR; INTRAVENOUS at 16:19

## 2021-06-18 RX ADMIN — KETAMINE HYDROCHLORIDE 2.38 MG/KG/HR: 100 INJECTION INTRAMUSCULAR; INTRAVENOUS at 00:04

## 2021-06-18 RX ADMIN — Medication 3 MILLILITER(S): at 13:06

## 2021-06-18 RX ADMIN — Medication 650 MILLIGRAM(S): at 09:35

## 2021-06-18 RX ADMIN — DEXMEDETOMIDINE HYDROCHLORIDE IN 0.9% SODIUM CHLORIDE 4.77 MICROGRAM(S)/KG/HR: 4 INJECTION INTRAVENOUS at 01:02

## 2021-06-18 RX ADMIN — FENTANYL CITRATE 5.2 MICROGRAM(S)/KG/HR: 50 INJECTION INTRAVENOUS at 15:02

## 2021-06-18 RX ADMIN — Medication 40 MILLIGRAM(S): at 17:47

## 2021-06-18 RX ADMIN — PROPOFOL 5.72 MICROGRAM(S)/KG/MIN: 10 INJECTION, EMULSION INTRAVENOUS at 15:02

## 2021-06-18 RX ADMIN — PROPOFOL 5.72 MICROGRAM(S)/KG/MIN: 10 INJECTION, EMULSION INTRAVENOUS at 22:41

## 2021-06-18 RX ADMIN — Medication 40 MILLIGRAM(S): at 01:13

## 2021-06-18 RX ADMIN — FENTANYL CITRATE 5.2 MICROGRAM(S)/KG/HR: 50 INJECTION INTRAVENOUS at 04:13

## 2021-06-18 RX ADMIN — MIDAZOLAM HYDROCHLORIDE 4 MILLIGRAM(S): 1 INJECTION, SOLUTION INTRAMUSCULAR; INTRAVENOUS at 13:15

## 2021-06-18 RX ADMIN — SENNA PLUS 2 TABLET(S): 8.6 TABLET ORAL at 21:23

## 2021-06-18 RX ADMIN — KETAMINE HYDROCHLORIDE 2.38 MG/KG/HR: 100 INJECTION INTRAMUSCULAR; INTRAVENOUS at 13:34

## 2021-06-18 RX ADMIN — CHLORHEXIDINE GLUCONATE 15 MILLILITER(S): 213 SOLUTION TOPICAL at 06:12

## 2021-06-18 RX ADMIN — FENTANYL CITRATE 50 MICROGRAM(S): 50 INJECTION INTRAVENOUS at 03:55

## 2021-06-18 RX ADMIN — PROPOFOL 5.72 MICROGRAM(S)/KG/MIN: 10 INJECTION, EMULSION INTRAVENOUS at 18:56

## 2021-06-18 RX ADMIN — DEXMEDETOMIDINE HYDROCHLORIDE IN 0.9% SODIUM CHLORIDE 5.2 MICROGRAM(S)/KG/HR: 4 INJECTION INTRAVENOUS at 13:47

## 2021-06-18 RX ADMIN — DEXMEDETOMIDINE HYDROCHLORIDE IN 0.9% SODIUM CHLORIDE 5.2 MICROGRAM(S)/KG/HR: 4 INJECTION INTRAVENOUS at 19:59

## 2021-06-18 RX ADMIN — PROPOFOL 5.72 MICROGRAM(S)/KG/MIN: 10 INJECTION, EMULSION INTRAVENOUS at 03:37

## 2021-06-18 RX ADMIN — Medication 3 MILLILITER(S): at 05:17

## 2021-06-18 RX ADMIN — DEXMEDETOMIDINE HYDROCHLORIDE IN 0.9% SODIUM CHLORIDE 5.2 MICROGRAM(S)/KG/HR: 4 INJECTION INTRAVENOUS at 15:02

## 2021-06-18 RX ADMIN — DEXMEDETOMIDINE HYDROCHLORIDE IN 0.9% SODIUM CHLORIDE 5.2 MICROGRAM(S)/KG/HR: 4 INJECTION INTRAVENOUS at 22:26

## 2021-06-18 RX ADMIN — MIDAZOLAM HYDROCHLORIDE 2 MILLIGRAM(S): 1 INJECTION, SOLUTION INTRAMUSCULAR; INTRAVENOUS at 11:10

## 2021-06-18 RX ADMIN — DEXMEDETOMIDINE HYDROCHLORIDE IN 0.9% SODIUM CHLORIDE 5.2 MICROGRAM(S)/KG/HR: 4 INJECTION INTRAVENOUS at 17:31

## 2021-06-18 RX ADMIN — KETAMINE HYDROCHLORIDE 2.38 MG/KG/HR: 100 INJECTION INTRAMUSCULAR; INTRAVENOUS at 19:32

## 2021-06-18 RX ADMIN — KETAMINE HYDROCHLORIDE 2.38 MG/KG/HR: 100 INJECTION INTRAMUSCULAR; INTRAVENOUS at 07:39

## 2021-06-18 RX ADMIN — CHLORHEXIDINE GLUCONATE 15 MILLILITER(S): 213 SOLUTION TOPICAL at 17:47

## 2021-06-18 RX ADMIN — KETAMINE HYDROCHLORIDE 2.38 MG/KG/HR: 100 INJECTION INTRAMUSCULAR; INTRAVENOUS at 04:15

## 2021-06-18 RX ADMIN — POLYETHYLENE GLYCOL 3350 17 GRAM(S): 17 POWDER, FOR SOLUTION ORAL at 13:46

## 2021-06-18 RX ADMIN — SODIUM CHLORIDE 3 MILLILITER(S): 9 INJECTION INTRAMUSCULAR; INTRAVENOUS; SUBCUTANEOUS at 05:17

## 2021-06-18 RX ADMIN — CEFTRIAXONE 100 MILLIGRAM(S): 500 INJECTION, POWDER, FOR SOLUTION INTRAMUSCULAR; INTRAVENOUS at 01:13

## 2021-06-18 RX ADMIN — Medication 3 MILLILITER(S): at 09:05

## 2021-06-18 RX ADMIN — Medication 40 MILLIGRAM(S): at 06:12

## 2021-06-18 RX ADMIN — SODIUM CHLORIDE 3 MILLILITER(S): 9 INJECTION INTRAMUSCULAR; INTRAVENOUS; SUBCUTANEOUS at 09:06

## 2021-06-18 RX ADMIN — HEPARIN SODIUM 5000 UNIT(S): 5000 INJECTION INTRAVENOUS; SUBCUTANEOUS at 06:12

## 2021-06-18 RX ADMIN — ENOXAPARIN SODIUM 40 MILLIGRAM(S): 100 INJECTION SUBCUTANEOUS at 17:47

## 2021-06-18 RX ADMIN — DEXMEDETOMIDINE HYDROCHLORIDE IN 0.9% SODIUM CHLORIDE 4.77 MICROGRAM(S)/KG/HR: 4 INJECTION INTRAVENOUS at 06:12

## 2021-06-18 RX ADMIN — SODIUM CHLORIDE 3 MILLILITER(S): 9 INJECTION INTRAMUSCULAR; INTRAVENOUS; SUBCUTANEOUS at 01:42

## 2021-06-18 RX ADMIN — KETAMINE HYDROCHLORIDE 2.38 MG/KG/HR: 100 INJECTION INTRAMUSCULAR; INTRAVENOUS at 10:25

## 2021-06-18 RX ADMIN — CHLORHEXIDINE GLUCONATE 1 APPLICATION(S): 213 SOLUTION TOPICAL at 06:13

## 2021-06-18 RX ADMIN — SODIUM CHLORIDE 3 MILLILITER(S): 9 INJECTION INTRAMUSCULAR; INTRAVENOUS; SUBCUTANEOUS at 17:29

## 2021-06-18 RX ADMIN — SODIUM CHLORIDE 3 MILLILITER(S): 9 INJECTION INTRAMUSCULAR; INTRAVENOUS; SUBCUTANEOUS at 13:06

## 2021-06-18 NOTE — DIETITIAN INITIAL EVALUATION ADULT. - CONTINUE CURRENT NUTRITION CARE PLAN
if pt remains on vent, recommend increase Vital AF 1.2 @ goal of 50 ml/hr =1200 ml, 1440 calories, 90 grams protein, 973 ml free water, 101% RDIs/yes

## 2021-06-18 NOTE — PROVIDER CONTACT NOTE (EICU) - SITUATION
20yoM h/o asthma presented with acute asthma exacerbation and CAP. Intubated due to acute resp failure.

## 2021-06-18 NOTE — DIETITIAN INITIAL EVALUATION ADULT. - PERTINENT MEDS FT
MEDICATIONS  (STANDING):  albuterol/ipratropium for Nebulization 3 milliLiter(s) Nebulizer every 4 hours  azithromycin  IVPB 500 milliGRAM(s) IV Intermittent every 24 hours  cefTRIAXone   IVPB 1000 milliGRAM(s) IV Intermittent every 24 hours  cefTRIAXone   IVPB      chlorhexidine 0.12% Liquid 15 milliLiter(s) Oral Mucosa every 12 hours  chlorhexidine 2% Cloths 1 Application(s) Topical <User Schedule>  enoxaparin Injectable 40 milliGRAM(s) SubCutaneous two times a day  fentaNYL   Infusion. 0.5 MICROgram(s)/kG/Hr (5.2 mL/Hr) IV Continuous <Continuous>  glucagon  Injectable 1 milliGRAM(s) IntraMuscular once  ketamine Infusion. 0.25 mG/kG/Hr (2.38 mL/Hr) IV Continuous <Continuous>  methylPREDNISolone sodium succinate Injectable 40 milliGRAM(s) IV Push every 12 hours  polyethylene glycol 3350 17 Gram(s) Oral daily  propofol Infusion 10 MICROgram(s)/kG/Min (5.72 mL/Hr) IV Continuous <Continuous>=337 ml(06/17)= 370.7 calories   senna 2 Tablet(s) Oral at bedtime  sodium chloride 3%  Inhalation 3 milliLiter(s) Inhalation every 4 hours    MEDICATIONS  (PRN):  ALBUTerol    0.083% 2.5 milliGRAM(s) Nebulizer every 2 hours PRN Bronchospasm

## 2021-06-18 NOTE — PROGRESS NOTE ADULT - SUBJECTIVE AND OBJECTIVE BOX
CHIEF COMPLAINT:    Interval Events:    REVIEW OF SYSTEMS:  Constitutional: [ ] fevers [ ] chills [ ] weight loss [ ] weight gain  HEENT: [ ] dry eyes [ ] eye irritation [ ] postnasal drip [ ] nasal congestion  CV: [ ] chest pain [ ] orthopnea [ ] palpitations [ ] murmur  Resp: [ ] cough [ ] shortness of breath [ ] dyspnea [ ] wheezing [ ] sputum [ ] hemoptysis  GI: [ ] nausea [ ] vomiting [ ] diarrhea [ ] constipation [ ] abd pain [ ] dysphagia   : [ ] dysuria [ ] nocturia [ ] hematuria [ ] increased urinary frequency  Musculoskeletal: [ ] back pain [ ] myalgias [ ] arthralgias [ ] fracture  Skin: [ ] rash [ ] itch  Neurological: [ ] headache [ ] dizziness [ ] syncope [ ] weakness [ ] numbness  Hematologic/Lymphatic: [ ] anemia [ ] bleeding problem  Allergic/Immunologic: [ ] itchy eyes [ ] nasal discharge [ ] hives [ ] angioedema  [ ] All other systems negative  [ ] Unable to assess ROS because ________    OBJECTIVE:  ICU Vital Signs Last 24 Hrs  T(C): 37.9 (2021 05:30), Max: 37.9 (2021 23:40)  T(F): 100.2 (2021 05:30), Max: 100.2 (2021 23:40)  HR: 106 (2021 07:10) (104 - 119)  BP: 101/55 (2021 07:00) (89/69 - 135/93)  BP(mean): 69 (2021 07:00) (58 - 107)  ABP: --  ABP(mean): --  RR: 12 (2021 07:10) (12 - 22)  SpO2: 97% (2021 07:10) (90% - 100%)    Mode: AC/ CMV (Assist Control/ Continuous Mandatory Ventilation), RR (machine): 12, TV (machine): 550, FiO2: 55, PEEP: 5, ITime: 0.8, MAP: 8, PIP: 37    06-17 @ 07:01  -  06-18 @ 07:00  --------------------------------------------------------  IN: 1978.7 mL / OUT: 3285 mL / NET: -1306.3 mL      CAPILLARY BLOOD GLUCOSE      POCT Blood Glucose.: 124 mg/dL (2021 06:17)      PHYSICAL EXAM:  General:   HEENT:   Neck:   Respiratory:   Cardiovascular:   Abdomen:   Extremities:   Skin:   Neurological:  Psychiatry:    LINES:    HOSPITAL MEDICATIONS:  MEDICATIONS  (STANDING):  albuterol/ipratropium for Nebulization 3 milliLiter(s) Nebulizer every 4 hours  azithromycin  IVPB 500 milliGRAM(s) IV Intermittent every 24 hours  cefTRIAXone   IVPB 1000 milliGRAM(s) IV Intermittent every 24 hours  cefTRIAXone   IVPB      chlorhexidine 0.12% Liquid 15 milliLiter(s) Oral Mucosa every 12 hours  chlorhexidine 2% Cloths 1 Application(s) Topical <User Schedule>  dexMEDEtomidine Infusion 0.2 MICROgram(s)/kG/Hr (4.77 mL/Hr) IV Continuous <Continuous>  fentaNYL   Infusion. 0.5 MICROgram(s)/kG/Hr (5.2 mL/Hr) IV Continuous <Continuous>  glucagon  Injectable 1 milliGRAM(s) IntraMuscular once  heparin   Injectable 5000 Unit(s) SubCutaneous every 8 hours  ketamine Infusion. 0.25 mG/kG/Hr (2.38 mL/Hr) IV Continuous <Continuous>  methylPREDNISolone sodium succinate Injectable 40 milliGRAM(s) IV Push every 6 hours  propofol Infusion 10 MICROgram(s)/kG/Min (5.72 mL/Hr) IV Continuous <Continuous>  sodium chloride 3%  Inhalation 3 milliLiter(s) Inhalation every 4 hours    MEDICATIONS  (PRN):  ALBUTerol    0.083% 2.5 milliGRAM(s) Nebulizer every 2 hours PRN Bronchospasm      LABS:                        14.3   15.16 )-----------( 212      ( 2021 04:15 )             45.0     Hgb Trend: 14.3<--, 15.2<--, 16.4<--  06-18    147<H>  |  109<H>  |  17  ----------------------------<  129<H>  4.5   |  32<H>  |  1.25    Ca    8.5      2021 04:15  Phos  4.4     06-18  Mg     2.6     06-18    TPro  6.6  /  Alb  3.1<L>  /  TBili  0.2  /  DBili  x   /  AST  35  /  ALT  32  /  AlkPhos  72  06-18      Urinalysis Basic - ( 2021 12:26 )    Color: Yellow / Appearance: Clear / S.010 / pH: x  Gluc: x / Ketone: Negative  / Bili: Negative / Urobili: Negative mg/dL   Blood: x / Protein: Negative mg/dL / Nitrite: Negative   Leuk Esterase: Negative / RBC: x / WBC x   Sq Epi: x / Non Sq Epi: x / Bacteria: x      Arterial Blood Gas:   @ 08:25  --/56/77/30/96/3.2  ABG lactate: --  Arterial Blood Gas:   @ 03:21  --/55/76//95/-2.7  ABG lactate: --  Arterial Blood Gas:   @ 01:08  --/60///95/-4.8  ABG lactate: --  Arterial Blood Gas:   @ 22:10  --////99/-5.0  ABG lactate: --  Arterial Blood Gas:   @ 18:21  --////99/-4.0  ABG lactate: --        MICROBIOLOGY:     RADIOLOGY:  [ ] Reviewed and interpreted by me CHIEF COMPLAINT:    Interval Events:  off paralytics since yesterday  fentanyl added overnight for vent synchrony and comfort    REVIEW OF SYSTEMS:  [x ] Unable to assess ROS because sedated/intubated    OBJECTIVE:  ICU Vital Signs Last 24 Hrs  T(C): 37.9 (2021 05:30), Max: 37.9 (2021 23:40)  T(F): 100.2 (2021 05:30), Max: 100.2 (2021 23:40)  HR: 106 (2021 07:10) (104 - 119)  BP: 101/55 (2021 07:00) (89/69 - 135/93)  BP(mean): 69 (2021 07:00) (58 - 107)  ABP: --  ABP(mean): --  RR: 12 (2021 07:10) (12 - 22)  SpO2: 97% (2021 07:10) (90% - 100%)    Mode: AC/ CMV (Assist Control/ Continuous Mandatory Ventilation), RR (machine): 12, TV (machine): 550, FiO2: 55, PEEP: 5, ITime: 0.8, MAP: 8, PIP: 37    06-17 @ 07:01  -  06-18 @ 07:00  --------------------------------------------------------  IN: 1978.7 mL / OUT: 3285 mL / NET: -1306.3 mL      CAPILLARY BLOOD GLUCOSE      POCT Blood Glucose.: 124 mg/dL (2021 06:17)      PHYSICAL EXAM:  General: NAD, non toxic appearing  HEENT: ETT and NGT in place  Neck: supple  Respiratory: expiratory wheezing and decreased air movement at the bases  Cardiovascular: s1s2 rrr  Abdomen: soft, non tender, non distended  Extremities: warm, no edema or clubbing  Skin: intact  Neurological: no focal deficits  Psychiatry: unable to assess    LINES:    HOSPITAL MEDICATIONS:  MEDICATIONS  (STANDING):  albuterol/ipratropium for Nebulization 3 milliLiter(s) Nebulizer every 4 hours  azithromycin  IVPB 500 milliGRAM(s) IV Intermittent every 24 hours  cefTRIAXone   IVPB 1000 milliGRAM(s) IV Intermittent every 24 hours  cefTRIAXone   IVPB      chlorhexidine 0.12% Liquid 15 milliLiter(s) Oral Mucosa every 12 hours  chlorhexidine 2% Cloths 1 Application(s) Topical <User Schedule>  dexMEDEtomidine Infusion 0.2 MICROgram(s)/kG/Hr (4.77 mL/Hr) IV Continuous <Continuous>  fentaNYL   Infusion. 0.5 MICROgram(s)/kG/Hr (5.2 mL/Hr) IV Continuous <Continuous>  glucagon  Injectable 1 milliGRAM(s) IntraMuscular once  heparin   Injectable 5000 Unit(s) SubCutaneous every 8 hours  ketamine Infusion. 0.25 mG/kG/Hr (2.38 mL/Hr) IV Continuous <Continuous>  methylPREDNISolone sodium succinate Injectable 40 milliGRAM(s) IV Push every 6 hours  propofol Infusion 10 MICROgram(s)/kG/Min (5.72 mL/Hr) IV Continuous <Continuous>  sodium chloride 3%  Inhalation 3 milliLiter(s) Inhalation every 4 hours    MEDICATIONS  (PRN):  ALBUTerol    0.083% 2.5 milliGRAM(s) Nebulizer every 2 hours PRN Bronchospasm      LABS:                        14.3   15.16 )-----------( 212      ( 2021 04:15 )             45.0     Hgb Trend: 14.3<--, 15.2<--, 16.4<--  06-18    147<H>  |  109<H>  |  17  ----------------------------<  129<H>  4.5   |  32<H>  |  1.25    Ca    8.5      2021 04:15  Phos  4.4     06-18  Mg     2.6     06-18    TPro  6.6  /  Alb  3.1<L>  /  TBili  0.2  /  DBili  x   /  AST  35  /  ALT  32  /  AlkPhos  72  06-18      Urinalysis Basic - ( 2021 12:26 )    Color: Yellow / Appearance: Clear / S.010 / pH: x  Gluc: x / Ketone: Negative  / Bili: Negative / Urobili: Negative mg/dL   Blood: x / Protein: Negative mg/dL / Nitrite: Negative   Leuk Esterase: Negative / RBC: x / WBC x   Sq Epi: x / Non Sq Epi: x / Bacteria: x      Arterial Blood Gas:   @ 08:25  --/56/77/30/96/3.2  ABG lactate: --  Arterial Blood Gas:   @ 03:21  --/55/76//95/-2.7  ABG lactate: --  Arterial Blood Gas:   @ 01:08  --/60///95/-4.8  ABG lactate: --  Arterial Blood Gas:   @ 22:10  --/62///99/-5.0  ABG lactate: --  Arterial Blood Gas:   @ 18:21  --/45///99/-4.0  ABG lactate: --        MICROBIOLOGY:     RADIOLOGY:  [ ] Reviewed and interpreted by me

## 2021-06-18 NOTE — DIETITIAN INITIAL EVALUATION ADULT. - OTHER CALCULATIONS
IBW of 67.1 kg used for calculation of estimated needs. 06/16, 103.9 kg, %IBW: 154%           % UBW: ?

## 2021-06-18 NOTE — DIETITIAN INITIAL EVALUATION ADULT. - PERTINENT LABORATORY DATA
06-18 Na147 mmol/L<H> Glu 129 mg/dL<H> K+ 4.5 mmol/L Cr  1.25 mg/dL BUN 17 mg/dL 06-18 Phos 4.4 mg/dL 06-18 Alb 3.1 g/dL<L>06-18 ALT 32 U/L AST 35 U/L Alkaline Phosphatase 72 U/L  06-18-21 @ 08:30 a1c 5.4

## 2021-06-18 NOTE — PROVIDER CONTACT NOTE (EICU) - BACKGROUND
eICU called with tachycardia and uncomfortable appearance. Pt was weaned off sedation and precedex. He currently is on propofol, ketamine, and fentanyl infusions. Per RN, the plan is to rest him on the vent today. tele shows sinus tachycardia 150s, /81.   Exam: Eyes open, moving limbs spontaneously but not following commands

## 2021-06-18 NOTE — DIETITIAN INITIAL EVALUATION ADULT. - PHYSCIAL ASSESSMENT
BMI=35.8(06/16)/well nourished/obese/other (specify) Pt is on vent, nutrition focus physical exam is limited, appears to be well developed, well nourished

## 2021-06-19 LAB
ANION GAP SERPL CALC-SCNC: 2 MMOL/L — LOW (ref 5–17)
BASE EXCESS BLDA CALC-SCNC: 7.4 MMOL/L — HIGH (ref -2–2)
BLOOD GAS COMMENTS: SIGNIFICANT CHANGE UP
BLOOD GAS COMMENTS: SIGNIFICANT CHANGE UP
BLOOD GAS SOURCE: SIGNIFICANT CHANGE UP
BUN SERPL-MCNC: 19 MG/DL — SIGNIFICANT CHANGE UP (ref 7–23)
CALCIUM SERPL-MCNC: 8 MG/DL — LOW (ref 8.5–10.1)
CHLORIDE SERPL-SCNC: 109 MMOL/L — HIGH (ref 96–108)
CO2 SERPL-SCNC: 33 MMOL/L — HIGH (ref 22–31)
CREAT SERPL-MCNC: 1.02 MG/DL — SIGNIFICANT CHANGE UP (ref 0.5–1.3)
GLUCOSE SERPL-MCNC: 113 MG/DL — HIGH (ref 70–99)
GRAM STN FLD: SIGNIFICANT CHANGE UP
HCO3 BLDA-SCNC: 33 MMOL/L — HIGH (ref 21–29)
HCT VFR BLD CALC: 43.5 % — SIGNIFICANT CHANGE UP (ref 39–50)
HGB BLD-MCNC: 13.2 G/DL — SIGNIFICANT CHANGE UP (ref 13–17)
HOROWITZ INDEX BLDA+IHG-RTO: 35 — SIGNIFICANT CHANGE UP
MAGNESIUM SERPL-MCNC: 2.8 MG/DL — HIGH (ref 1.6–2.6)
MCHC RBC-ENTMCNC: 27.1 PG — SIGNIFICANT CHANGE UP (ref 27–34)
MCHC RBC-ENTMCNC: 30.3 GM/DL — LOW (ref 32–36)
MCV RBC AUTO: 89.3 FL — SIGNIFICANT CHANGE UP (ref 80–100)
NRBC # BLD: 0 /100 WBCS — SIGNIFICANT CHANGE UP (ref 0–0)
PCO2 BLDA: 53 MMHG — HIGH (ref 32–46)
PH BLD: 7.41 — SIGNIFICANT CHANGE UP (ref 7.35–7.45)
PHOSPHATE SERPL-MCNC: 3.3 MG/DL — SIGNIFICANT CHANGE UP (ref 2.5–4.5)
PLATELET # BLD AUTO: 194 K/UL — SIGNIFICANT CHANGE UP (ref 150–400)
PO2 BLDA: 97 MMHG — SIGNIFICANT CHANGE UP (ref 74–108)
POTASSIUM SERPL-MCNC: 4.4 MMOL/L — SIGNIFICANT CHANGE UP (ref 3.5–5.3)
POTASSIUM SERPL-SCNC: 4.4 MMOL/L — SIGNIFICANT CHANGE UP (ref 3.5–5.3)
RBC # BLD: 4.87 M/UL — SIGNIFICANT CHANGE UP (ref 4.2–5.8)
RBC # FLD: 14.8 % — HIGH (ref 10.3–14.5)
SAO2 % BLDA: 98 % — HIGH (ref 92–96)
SODIUM SERPL-SCNC: 144 MMOL/L — SIGNIFICANT CHANGE UP (ref 135–145)
SPECIMEN SOURCE: SIGNIFICANT CHANGE UP
WBC # BLD: 12.12 K/UL — HIGH (ref 3.8–10.5)
WBC # FLD AUTO: 12.12 K/UL — HIGH (ref 3.8–10.5)

## 2021-06-19 PROCEDURE — 71045 X-RAY EXAM CHEST 1 VIEW: CPT | Mod: 26

## 2021-06-19 PROCEDURE — 99291 CRITICAL CARE FIRST HOUR: CPT

## 2021-06-19 RX ORDER — ACETAMINOPHEN 500 MG
650 TABLET ORAL ONCE
Refills: 0 | Status: COMPLETED | OUTPATIENT
Start: 2021-06-19 | End: 2021-06-19

## 2021-06-19 RX ORDER — CISATRACURIUM BESYLATE 2 MG/ML
10 INJECTION INTRAVENOUS ONCE
Refills: 0 | Status: COMPLETED | OUTPATIENT
Start: 2021-06-19 | End: 2021-06-19

## 2021-06-19 RX ORDER — ACETAMINOPHEN 500 MG
1000 TABLET ORAL ONCE
Refills: 0 | Status: COMPLETED | OUTPATIENT
Start: 2021-06-19 | End: 2021-06-19

## 2021-06-19 RX ADMIN — KETAMINE HYDROCHLORIDE 2.38 MG/KG/HR: 100 INJECTION INTRAMUSCULAR; INTRAVENOUS at 11:42

## 2021-06-19 RX ADMIN — KETAMINE HYDROCHLORIDE 2.38 MG/KG/HR: 100 INJECTION INTRAMUSCULAR; INTRAVENOUS at 01:13

## 2021-06-19 RX ADMIN — ENOXAPARIN SODIUM 40 MILLIGRAM(S): 100 INJECTION SUBCUTANEOUS at 05:19

## 2021-06-19 RX ADMIN — Medication 1000 MILLIGRAM(S): at 22:31

## 2021-06-19 RX ADMIN — PROPOFOL 5.72 MICROGRAM(S)/KG/MIN: 10 INJECTION, EMULSION INTRAVENOUS at 03:08

## 2021-06-19 RX ADMIN — CEFTRIAXONE 100 MILLIGRAM(S): 500 INJECTION, POWDER, FOR SOLUTION INTRAMUSCULAR; INTRAVENOUS at 00:59

## 2021-06-19 RX ADMIN — Medication 3 MILLILITER(S): at 18:02

## 2021-06-19 RX ADMIN — DEXMEDETOMIDINE HYDROCHLORIDE IN 0.9% SODIUM CHLORIDE 5.2 MICROGRAM(S)/KG/HR: 4 INJECTION INTRAVENOUS at 22:01

## 2021-06-19 RX ADMIN — FENTANYL CITRATE 5.2 MICROGRAM(S)/KG/HR: 50 INJECTION INTRAVENOUS at 15:48

## 2021-06-19 RX ADMIN — POLYETHYLENE GLYCOL 3350 17 GRAM(S): 17 POWDER, FOR SOLUTION ORAL at 11:42

## 2021-06-19 RX ADMIN — DEXMEDETOMIDINE HYDROCHLORIDE IN 0.9% SODIUM CHLORIDE 5.2 MICROGRAM(S)/KG/HR: 4 INJECTION INTRAVENOUS at 03:09

## 2021-06-19 RX ADMIN — KETAMINE HYDROCHLORIDE 2.38 MG/KG/HR: 100 INJECTION INTRAMUSCULAR; INTRAVENOUS at 04:35

## 2021-06-19 RX ADMIN — Medication 3 MILLILITER(S): at 23:45

## 2021-06-19 RX ADMIN — SODIUM CHLORIDE 3 MILLILITER(S): 9 INJECTION INTRAMUSCULAR; INTRAVENOUS; SUBCUTANEOUS at 06:05

## 2021-06-19 RX ADMIN — Medication 3 MILLILITER(S): at 06:05

## 2021-06-19 RX ADMIN — ENOXAPARIN SODIUM 40 MILLIGRAM(S): 100 INJECTION SUBCUTANEOUS at 17:56

## 2021-06-19 RX ADMIN — Medication 40 MILLIGRAM(S): at 05:19

## 2021-06-19 RX ADMIN — KETAMINE HYDROCHLORIDE 2.38 MG/KG/HR: 100 INJECTION INTRAMUSCULAR; INTRAVENOUS at 22:56

## 2021-06-19 RX ADMIN — Medication 400 MILLIGRAM(S): at 22:01

## 2021-06-19 RX ADMIN — CHLORHEXIDINE GLUCONATE 15 MILLILITER(S): 213 SOLUTION TOPICAL at 05:19

## 2021-06-19 RX ADMIN — Medication 650 MILLIGRAM(S): at 13:51

## 2021-06-19 RX ADMIN — KETAMINE HYDROCHLORIDE 2.38 MG/KG/HR: 100 INJECTION INTRAMUSCULAR; INTRAVENOUS at 15:45

## 2021-06-19 RX ADMIN — DEXMEDETOMIDINE HYDROCHLORIDE IN 0.9% SODIUM CHLORIDE 5.2 MICROGRAM(S)/KG/HR: 4 INJECTION INTRAVENOUS at 13:51

## 2021-06-19 RX ADMIN — PROPOFOL 5.72 MICROGRAM(S)/KG/MIN: 10 INJECTION, EMULSION INTRAVENOUS at 06:56

## 2021-06-19 RX ADMIN — PROPOFOL 5.72 MICROGRAM(S)/KG/MIN: 10 INJECTION, EMULSION INTRAVENOUS at 17:54

## 2021-06-19 RX ADMIN — PROPOFOL 5.72 MICROGRAM(S)/KG/MIN: 10 INJECTION, EMULSION INTRAVENOUS at 09:05

## 2021-06-19 RX ADMIN — DEXMEDETOMIDINE HYDROCHLORIDE IN 0.9% SODIUM CHLORIDE 5.2 MICROGRAM(S)/KG/HR: 4 INJECTION INTRAVENOUS at 09:05

## 2021-06-19 RX ADMIN — AZITHROMYCIN 255 MILLIGRAM(S): 500 TABLET, FILM COATED ORAL at 17:54

## 2021-06-19 RX ADMIN — DEXMEDETOMIDINE HYDROCHLORIDE IN 0.9% SODIUM CHLORIDE 5.2 MICROGRAM(S)/KG/HR: 4 INJECTION INTRAVENOUS at 11:42

## 2021-06-19 RX ADMIN — Medication 40 MILLIGRAM(S): at 17:58

## 2021-06-19 RX ADMIN — CHLORHEXIDINE GLUCONATE 1 APPLICATION(S): 213 SOLUTION TOPICAL at 05:19

## 2021-06-19 RX ADMIN — CHLORHEXIDINE GLUCONATE 15 MILLILITER(S): 213 SOLUTION TOPICAL at 17:54

## 2021-06-19 RX ADMIN — FENTANYL CITRATE 5.2 MICROGRAM(S)/KG/HR: 50 INJECTION INTRAVENOUS at 03:09

## 2021-06-19 RX ADMIN — KETAMINE HYDROCHLORIDE 2.38 MG/KG/HR: 100 INJECTION INTRAMUSCULAR; INTRAVENOUS at 07:58

## 2021-06-19 RX ADMIN — Medication 3 MILLILITER(S): at 01:03

## 2021-06-19 RX ADMIN — DEXMEDETOMIDINE HYDROCHLORIDE IN 0.9% SODIUM CHLORIDE 5.2 MICROGRAM(S)/KG/HR: 4 INJECTION INTRAVENOUS at 00:41

## 2021-06-19 RX ADMIN — Medication 3 MILLILITER(S): at 09:07

## 2021-06-19 RX ADMIN — DEXMEDETOMIDINE HYDROCHLORIDE IN 0.9% SODIUM CHLORIDE 5.2 MICROGRAM(S)/KG/HR: 4 INJECTION INTRAVENOUS at 06:37

## 2021-06-19 RX ADMIN — DEXMEDETOMIDINE HYDROCHLORIDE IN 0.9% SODIUM CHLORIDE 5.2 MICROGRAM(S)/KG/HR: 4 INJECTION INTRAVENOUS at 19:46

## 2021-06-19 RX ADMIN — Medication 3 MILLILITER(S): at 14:05

## 2021-06-19 RX ADMIN — KETAMINE HYDROCHLORIDE 2.38 MG/KG/HR: 100 INJECTION INTRAMUSCULAR; INTRAVENOUS at 19:36

## 2021-06-19 RX ADMIN — DEXMEDETOMIDINE HYDROCHLORIDE IN 0.9% SODIUM CHLORIDE 5.2 MICROGRAM(S)/KG/HR: 4 INJECTION INTRAVENOUS at 15:49

## 2021-06-19 RX ADMIN — PROPOFOL 5.72 MICROGRAM(S)/KG/MIN: 10 INJECTION, EMULSION INTRAVENOUS at 21:03

## 2021-06-19 RX ADMIN — SODIUM CHLORIDE 3 MILLILITER(S): 9 INJECTION INTRAMUSCULAR; INTRAVENOUS; SUBCUTANEOUS at 01:04

## 2021-06-19 NOTE — PROGRESS NOTE ADULT - SUBJECTIVE AND OBJECTIVE BOX
CHIEF COMPLAINT:    Interval Events:    REVIEW OF SYSTEMS:  Constitutional: [ ] fevers [ ] chills [ ] weight loss [ ] weight gain  HEENT: [ ] dry eyes [ ] eye irritation [ ] postnasal drip [ ] nasal congestion  CV: [ ] chest pain [ ] orthopnea [ ] palpitations [ ] murmur  Resp: [ ] cough [ ] shortness of breath [ ] dyspnea [ ] wheezing [ ] sputum [ ] hemoptysis  GI: [ ] nausea [ ] vomiting [ ] diarrhea [ ] constipation [ ] abd pain [ ] dysphagia   : [ ] dysuria [ ] nocturia [ ] hematuria [ ] increased urinary frequency  Musculoskeletal: [ ] back pain [ ] myalgias [ ] arthralgias [ ] fracture  Skin: [ ] rash [ ] itch  Neurological: [ ] headache [ ] dizziness [ ] syncope [ ] weakness [ ] numbness  Hematologic/Lymphatic: [ ] anemia [ ] bleeding problem  Allergic/Immunologic: [ ] itchy eyes [ ] nasal discharge [ ] hives [ ] angioedema  [ ] All other systems negative  [ ] Unable to assess ROS because ________    OBJECTIVE:  ICU Vital Signs Last 24 Hrs  T(C): 37.7 (2021 05:00), Max: 38.6 (2021 13:30)  T(F): 99.8 (2021 05:00), Max: 101.5 (2021 13:30)  HR: 95 (2021 07:00) (93 - 149)  BP: 108/67 (2021 07:00) (84/47 - 117/63)  BP(mean): 79 (2021 07:00) (58 - 91)  ABP: --  ABP(mean): --  RR: 12 (2021 07:00) (12 - 18)  SpO2: 96% (2021 07:00) (91% - 99%)    Mode: AC/ CMV (Assist Control/ Continuous Mandatory Ventilation), RR (machine): 12, TV (machine): 550, FiO2: 40, PEEP: 5, ITime: 0.8, MAP: 9, PIP: 31    -18 @ 07:01  -  06-19 @ 07:00  --------------------------------------------------------  IN: 3643.7 mL / OUT: 3355 mL / NET: 288.7 mL      CAPILLARY BLOOD GLUCOSE      POCT Blood Glucose.: 124 mg/dL (2021 06:17)      PHYSICAL EXAM:  General:   HEENT:   Neck:   Respiratory:   Cardiovascular:   Abdomen:   Extremities:   Skin:   Neurological:  Psychiatry:    LINES:    HOSPITAL MEDICATIONS:  MEDICATIONS  (STANDING):  albuterol/ipratropium for Nebulization 3 milliLiter(s) Nebulizer every 4 hours  azithromycin  IVPB 500 milliGRAM(s) IV Intermittent every 24 hours  cefTRIAXone   IVPB      cefTRIAXone   IVPB 1000 milliGRAM(s) IV Intermittent every 24 hours  chlorhexidine 0.12% Liquid 15 milliLiter(s) Oral Mucosa every 12 hours  chlorhexidine 2% Cloths 1 Application(s) Topical <User Schedule>  dexMEDEtomidine Infusion 0.2 MICROgram(s)/kG/Hr (5.2 mL/Hr) IV Continuous <Continuous>  enoxaparin Injectable 40 milliGRAM(s) SubCutaneous two times a day  fentaNYL   Infusion. 0.5 MICROgram(s)/kG/Hr (5.2 mL/Hr) IV Continuous <Continuous>  glucagon  Injectable 1 milliGRAM(s) IntraMuscular once  ketamine Infusion. 0.25 mG/kG/Hr (2.38 mL/Hr) IV Continuous <Continuous>  methylPREDNISolone sodium succinate Injectable 40 milliGRAM(s) IV Push every 12 hours  polyethylene glycol 3350 17 Gram(s) Oral daily  propofol Infusion 10 MICROgram(s)/kG/Min (5.72 mL/Hr) IV Continuous <Continuous>  senna 2 Tablet(s) Oral at bedtime  sodium chloride 3%  Inhalation 3 milliLiter(s) Inhalation every 4 hours    MEDICATIONS  (PRN):  ALBUTerol    0.083% 2.5 milliGRAM(s) Nebulizer every 2 hours PRN Bronchospasm      LABS:                        13.2   12.12 )-----------( 194      ( 2021 04:15 )             43.5     Hgb Trend: 13.2<--, 14.3<--, 15.2<--, 16.4<--  06-19    144  |  109<H>  |  19  ----------------------------<  113<H>  4.4   |  33<H>  |  1.02    Ca    8.0<L>      2021 04:15  Phos  3.3     06-  Mg     2.8     -    TPro  6.6  /  Alb  3.1<L>  /  TBili  0.2  /  DBili  x   /  AST  35  /  ALT  32  /  AlkPhos  72  06-      Urinalysis Basic - ( 2021 12:26 )    Color: Yellow / Appearance: Clear / S.010 / pH: x  Gluc: x / Ketone: Negative  / Bili: Negative / Urobili: Negative mg/dL   Blood: x / Protein: Negative mg/dL / Nitrite: Negative   Leuk Esterase: Negative / RBC: x / WBC x   Sq Epi: x / Non Sq Epi: x / Bacteria: x      Arterial Blood Gas:   @ 08:25  --/56/77/30/96/3.2  ABG lactate: --        MICROBIOLOGY:     RADIOLOGY:  [ ] Reviewed and interpreted by me CHIEF COMPLAINT:    Interval Events:  no o/n events    REVIEW OF SYSTEMS:  [x ] Unable to assess ROS because intubated/sedated    OBJECTIVE:  ICU Vital Signs Last 24 Hrs  T(C): 37.7 (2021 05:00), Max: 38.6 (2021 13:30)  T(F): 99.8 (2021 05:00), Max: 101.5 (2021 13:30)  HR: 95 (2021 07:00) (93 - 149)  BP: 108/67 (2021 07:00) (84/47 - 117/63)  BP(mean): 79 (2021 07:00) (58 - 91)  ABP: --  ABP(mean): --  RR: 12 (2021 07:00) (12 - 18)  SpO2: 96% (2021 07:00) (91% - 99%)    Mode: AC/ CMV (Assist Control/ Continuous Mandatory Ventilation), RR (machine): 12, TV (machine): 550, FiO2: 40, PEEP: 5, ITime: 0.8, MAP: 9, PIP: 31    -18 @ 07:01  -  06-19 @ 07:00  --------------------------------------------------------  IN: 3643.7 mL / OUT: 3355 mL / NET: 288.7 mL      CAPILLARY BLOOD GLUCOSE      POCT Blood Glucose.: 124 mg/dL (2021 06:17)      PHYSICAL EXAM:  General: NAD, non toxic appearing  HEENT: ETT and NGT in place  Neck: supple  Respiratory: expiratory wheezing and improved air movements at the bases w/ expiratory wheezing  Cardiovascular: s1s2 rrr  Abdomen: soft, non tender, non distended  Extremities: warm, no edema or clubbing  Skin: intact  Neurological: no focal deficits  Psychiatry: unable to assess    LINES:    HOSPITAL MEDICATIONS:  MEDICATIONS  (STANDING):  albuterol/ipratropium for Nebulization 3 milliLiter(s) Nebulizer every 4 hours  azithromycin  IVPB 500 milliGRAM(s) IV Intermittent every 24 hours  cefTRIAXone   IVPB      cefTRIAXone   IVPB 1000 milliGRAM(s) IV Intermittent every 24 hours  chlorhexidine 0.12% Liquid 15 milliLiter(s) Oral Mucosa every 12 hours  chlorhexidine 2% Cloths 1 Application(s) Topical <User Schedule>  dexMEDEtomidine Infusion 0.2 MICROgram(s)/kG/Hr (5.2 mL/Hr) IV Continuous <Continuous>  enoxaparin Injectable 40 milliGRAM(s) SubCutaneous two times a day  fentaNYL   Infusion. 0.5 MICROgram(s)/kG/Hr (5.2 mL/Hr) IV Continuous <Continuous>  glucagon  Injectable 1 milliGRAM(s) IntraMuscular once  ketamine Infusion. 0.25 mG/kG/Hr (2.38 mL/Hr) IV Continuous <Continuous>  methylPREDNISolone sodium succinate Injectable 40 milliGRAM(s) IV Push every 12 hours  polyethylene glycol 3350 17 Gram(s) Oral daily  propofol Infusion 10 MICROgram(s)/kG/Min (5.72 mL/Hr) IV Continuous <Continuous>  senna 2 Tablet(s) Oral at bedtime  sodium chloride 3%  Inhalation 3 milliLiter(s) Inhalation every 4 hours    MEDICATIONS  (PRN):  ALBUTerol    0.083% 2.5 milliGRAM(s) Nebulizer every 2 hours PRN Bronchospasm      LABS:                        13.2   12.12 )-----------( 194      ( 2021 04:15 )             43.5     Hgb Trend: 13.2<--, 14.3<--, 15.2<--, 16.4<--  06-19    144  |  109<H>  |  19  ----------------------------<  113<H>  4.4   |  33<H>  |  1.02    Ca    8.0<L>      2021 04:15  Phos  3.3     06-19  Mg     2.8     -19    TPro  6.6  /  Alb  3.1<L>  /  TBili  0.2  /  DBili  x   /  AST  35  /  ALT  32  /  AlkPhos  72  06-18      Urinalysis Basic - ( 2021 12:26 )    Color: Yellow / Appearance: Clear / S.010 / pH: x  Gluc: x / Ketone: Negative  / Bili: Negative / Urobili: Negative mg/dL   Blood: x / Protein: Negative mg/dL / Nitrite: Negative   Leuk Esterase: Negative / RBC: x / WBC x   Sq Epi: x / Non Sq Epi: x / Bacteria: x      Arterial Blood Gas:   @ 08:25  --/56/77/30/96/3.2  ABG lactate: --        MICROBIOLOGY:     RADIOLOGY:  [ ] Reviewed and interpreted by me

## 2021-06-20 LAB
ALBUMIN SERPL ELPH-MCNC: 2.6 G/DL — LOW (ref 3.3–5)
ALP SERPL-CCNC: 62 U/L — SIGNIFICANT CHANGE UP (ref 40–120)
ALT FLD-CCNC: 37 U/L — SIGNIFICANT CHANGE UP (ref 12–78)
ANION GAP SERPL CALC-SCNC: 3 MMOL/L — LOW (ref 5–17)
AST SERPL-CCNC: 48 U/L — HIGH (ref 15–37)
BASOPHILS # BLD AUTO: 0 K/UL — SIGNIFICANT CHANGE UP (ref 0–0.2)
BASOPHILS NFR BLD AUTO: 0 % — SIGNIFICANT CHANGE UP (ref 0–2)
BILIRUB SERPL-MCNC: 0.3 MG/DL — SIGNIFICANT CHANGE UP (ref 0.2–1.2)
BUN SERPL-MCNC: 17 MG/DL — SIGNIFICANT CHANGE UP (ref 7–23)
CALCIUM SERPL-MCNC: 8 MG/DL — LOW (ref 8.5–10.1)
CHLORIDE SERPL-SCNC: 110 MMOL/L — HIGH (ref 96–108)
CO2 SERPL-SCNC: 30 MMOL/L — SIGNIFICANT CHANGE UP (ref 22–31)
CREAT SERPL-MCNC: 1 MG/DL — SIGNIFICANT CHANGE UP (ref 0.5–1.3)
CULTURE RESULTS: SIGNIFICANT CHANGE UP
EOSINOPHIL # BLD AUTO: 0 K/UL — SIGNIFICANT CHANGE UP (ref 0–0.5)
EOSINOPHIL NFR BLD AUTO: 0 % — SIGNIFICANT CHANGE UP (ref 0–6)
GLUCOSE SERPL-MCNC: 115 MG/DL — HIGH (ref 70–99)
GRAM STN FLD: SIGNIFICANT CHANGE UP
HCT VFR BLD CALC: 43.6 % — SIGNIFICANT CHANGE UP (ref 39–50)
HGB BLD-MCNC: 13.4 G/DL — SIGNIFICANT CHANGE UP (ref 13–17)
IMM GRANULOCYTES NFR BLD AUTO: 0.2 % — SIGNIFICANT CHANGE UP (ref 0–1.5)
LEGIONELLA AG UR QL: NEGATIVE — SIGNIFICANT CHANGE UP
LYMPHOCYTES # BLD AUTO: 1.68 K/UL — SIGNIFICANT CHANGE UP (ref 1–3.3)
LYMPHOCYTES # BLD AUTO: 16.8 % — SIGNIFICANT CHANGE UP (ref 13–44)
MAGNESIUM SERPL-MCNC: 2.4 MG/DL — SIGNIFICANT CHANGE UP (ref 1.6–2.6)
MCHC RBC-ENTMCNC: 27.5 PG — SIGNIFICANT CHANGE UP (ref 27–34)
MCHC RBC-ENTMCNC: 30.7 GM/DL — LOW (ref 32–36)
MCV RBC AUTO: 89.3 FL — SIGNIFICANT CHANGE UP (ref 80–100)
MONOCYTES # BLD AUTO: 0.93 K/UL — HIGH (ref 0–0.9)
MONOCYTES NFR BLD AUTO: 9.3 % — SIGNIFICANT CHANGE UP (ref 2–14)
NEUTROPHILS # BLD AUTO: 7.35 K/UL — SIGNIFICANT CHANGE UP (ref 1.8–7.4)
NEUTROPHILS NFR BLD AUTO: 73.7 % — SIGNIFICANT CHANGE UP (ref 43–77)
NRBC # BLD: 0 /100 WBCS — SIGNIFICANT CHANGE UP (ref 0–0)
PHOSPHATE SERPL-MCNC: 2.7 MG/DL — SIGNIFICANT CHANGE UP (ref 2.5–4.5)
PLATELET # BLD AUTO: 173 K/UL — SIGNIFICANT CHANGE UP (ref 150–400)
POTASSIUM SERPL-MCNC: 4.3 MMOL/L — SIGNIFICANT CHANGE UP (ref 3.5–5.3)
POTASSIUM SERPL-SCNC: 4.3 MMOL/L — SIGNIFICANT CHANGE UP (ref 3.5–5.3)
PROT SERPL-MCNC: 6.3 GM/DL — SIGNIFICANT CHANGE UP (ref 6–8.3)
RBC # BLD: 4.88 M/UL — SIGNIFICANT CHANGE UP (ref 4.2–5.8)
RBC # FLD: 14.6 % — HIGH (ref 10.3–14.5)
SODIUM SERPL-SCNC: 143 MMOL/L — SIGNIFICANT CHANGE UP (ref 135–145)
SPECIMEN SOURCE: SIGNIFICANT CHANGE UP
WBC # BLD: 9.98 K/UL — SIGNIFICANT CHANGE UP (ref 3.8–10.5)
WBC # FLD AUTO: 9.98 K/UL — SIGNIFICANT CHANGE UP (ref 3.8–10.5)

## 2021-06-20 PROCEDURE — 99291 CRITICAL CARE FIRST HOUR: CPT

## 2021-06-20 RX ORDER — BENZOCAINE AND MENTHOL 5; 1 G/100ML; G/100ML
1 LIQUID ORAL EVERY 4 HOURS
Refills: 0 | Status: DISCONTINUED | OUTPATIENT
Start: 2021-06-20 | End: 2021-06-23

## 2021-06-20 RX ORDER — ONDANSETRON 8 MG/1
4 TABLET, FILM COATED ORAL ONCE
Refills: 0 | Status: COMPLETED | OUTPATIENT
Start: 2021-06-20 | End: 2021-06-20

## 2021-06-20 RX ORDER — FAMOTIDINE 10 MG/ML
20 INJECTION INTRAVENOUS ONCE
Refills: 0 | Status: COMPLETED | OUTPATIENT
Start: 2021-06-20 | End: 2021-06-20

## 2021-06-20 RX ORDER — KETOROLAC TROMETHAMINE 30 MG/ML
15 SYRINGE (ML) INJECTION
Refills: 0 | Status: DISCONTINUED | OUTPATIENT
Start: 2021-06-20 | End: 2021-06-21

## 2021-06-20 RX ORDER — DEXMEDETOMIDINE HYDROCHLORIDE IN 0.9% SODIUM CHLORIDE 4 UG/ML
0.2 INJECTION INTRAVENOUS
Qty: 200 | Refills: 0 | Status: DISCONTINUED | OUTPATIENT
Start: 2021-06-20 | End: 2021-06-21

## 2021-06-20 RX ADMIN — KETAMINE HYDROCHLORIDE 2.38 MG/KG/HR: 100 INJECTION INTRAMUSCULAR; INTRAVENOUS at 02:02

## 2021-06-20 RX ADMIN — PROPOFOL 5.72 MICROGRAM(S)/KG/MIN: 10 INJECTION, EMULSION INTRAVENOUS at 05:45

## 2021-06-20 RX ADMIN — DEXMEDETOMIDINE HYDROCHLORIDE IN 0.9% SODIUM CHLORIDE 5.2 MICROGRAM(S)/KG/HR: 4 INJECTION INTRAVENOUS at 02:02

## 2021-06-20 RX ADMIN — Medication 40 MILLIGRAM(S): at 05:49

## 2021-06-20 RX ADMIN — Medication 3 MILLILITER(S): at 01:33

## 2021-06-20 RX ADMIN — Medication 15 MILLIGRAM(S): at 18:44

## 2021-06-20 RX ADMIN — CHLORHEXIDINE GLUCONATE 1 APPLICATION(S): 213 SOLUTION TOPICAL at 05:45

## 2021-06-20 RX ADMIN — ALBUTEROL 2.5 MILLIGRAM(S): 90 AEROSOL, METERED ORAL at 12:23

## 2021-06-20 RX ADMIN — Medication 40 MILLIGRAM(S): at 17:55

## 2021-06-20 RX ADMIN — Medication 3 MILLILITER(S): at 09:05

## 2021-06-20 RX ADMIN — DEXMEDETOMIDINE HYDROCHLORIDE IN 0.9% SODIUM CHLORIDE 5.2 MICROGRAM(S)/KG/HR: 4 INJECTION INTRAVENOUS at 00:01

## 2021-06-20 RX ADMIN — Medication 3 MILLILITER(S): at 22:00

## 2021-06-20 RX ADMIN — DEXMEDETOMIDINE HYDROCHLORIDE IN 0.9% SODIUM CHLORIDE 5.2 MICROGRAM(S)/KG/HR: 4 INJECTION INTRAVENOUS at 03:51

## 2021-06-20 RX ADMIN — DEXMEDETOMIDINE HYDROCHLORIDE IN 0.9% SODIUM CHLORIDE 5.2 MICROGRAM(S)/KG/HR: 4 INJECTION INTRAVENOUS at 22:38

## 2021-06-20 RX ADMIN — PROPOFOL 5.72 MICROGRAM(S)/KG/MIN: 10 INJECTION, EMULSION INTRAVENOUS at 00:01

## 2021-06-20 RX ADMIN — DEXMEDETOMIDINE HYDROCHLORIDE IN 0.9% SODIUM CHLORIDE 5.2 MICROGRAM(S)/KG/HR: 4 INJECTION INTRAVENOUS at 09:35

## 2021-06-20 RX ADMIN — ONDANSETRON 4 MILLIGRAM(S): 8 TABLET, FILM COATED ORAL at 20:39

## 2021-06-20 RX ADMIN — CEFTRIAXONE 100 MILLIGRAM(S): 500 INJECTION, POWDER, FOR SOLUTION INTRAMUSCULAR; INTRAVENOUS at 00:06

## 2021-06-20 RX ADMIN — CHLORHEXIDINE GLUCONATE 15 MILLILITER(S): 213 SOLUTION TOPICAL at 05:45

## 2021-06-20 RX ADMIN — ENOXAPARIN SODIUM 40 MILLIGRAM(S): 100 INJECTION SUBCUTANEOUS at 05:47

## 2021-06-20 RX ADMIN — DEXMEDETOMIDINE HYDROCHLORIDE IN 0.9% SODIUM CHLORIDE 5.2 MICROGRAM(S)/KG/HR: 4 INJECTION INTRAVENOUS at 08:37

## 2021-06-20 RX ADMIN — Medication 3 MILLILITER(S): at 17:09

## 2021-06-20 RX ADMIN — KETAMINE HYDROCHLORIDE 2.38 MG/KG/HR: 100 INJECTION INTRAMUSCULAR; INTRAVENOUS at 05:46

## 2021-06-20 RX ADMIN — BENZOCAINE AND MENTHOL 1 LOZENGE: 5; 1 LIQUID ORAL at 18:32

## 2021-06-20 RX ADMIN — DEXMEDETOMIDINE HYDROCHLORIDE IN 0.9% SODIUM CHLORIDE 5.2 MICROGRAM(S)/KG/HR: 4 INJECTION INTRAVENOUS at 05:46

## 2021-06-20 RX ADMIN — Medication 15 MILLIGRAM(S): at 19:14

## 2021-06-20 RX ADMIN — FAMOTIDINE 20 MILLIGRAM(S): 10 INJECTION INTRAVENOUS at 20:47

## 2021-06-20 RX ADMIN — PROPOFOL 5.72 MICROGRAM(S)/KG/MIN: 10 INJECTION, EMULSION INTRAVENOUS at 10:46

## 2021-06-20 RX ADMIN — AZITHROMYCIN 255 MILLIGRAM(S): 500 TABLET, FILM COATED ORAL at 17:55

## 2021-06-20 RX ADMIN — KETAMINE HYDROCHLORIDE 2.38 MG/KG/HR: 100 INJECTION INTRAMUSCULAR; INTRAVENOUS at 09:35

## 2021-06-20 RX ADMIN — Medication 3 MILLILITER(S): at 13:52

## 2021-06-20 RX ADMIN — Medication 100 MILLIGRAM(S): at 18:32

## 2021-06-20 RX ADMIN — PROPOFOL 5.72 MICROGRAM(S)/KG/MIN: 10 INJECTION, EMULSION INTRAVENOUS at 02:02

## 2021-06-20 RX ADMIN — Medication 15 MILLIGRAM(S): at 23:44

## 2021-06-20 RX ADMIN — Medication 3 MILLILITER(S): at 05:19

## 2021-06-20 RX ADMIN — ENOXAPARIN SODIUM 40 MILLIGRAM(S): 100 INJECTION SUBCUTANEOUS at 17:55

## 2021-06-20 RX ADMIN — PROPOFOL 5.72 MICROGRAM(S)/KG/MIN: 10 INJECTION, EMULSION INTRAVENOUS at 08:12

## 2021-06-20 RX ADMIN — DEXMEDETOMIDINE HYDROCHLORIDE IN 0.9% SODIUM CHLORIDE 5.2 MICROGRAM(S)/KG/HR: 4 INJECTION INTRAVENOUS at 11:23

## 2021-06-20 RX ADMIN — FENTANYL CITRATE 5.2 MICROGRAM(S)/KG/HR: 50 INJECTION INTRAVENOUS at 02:03

## 2021-06-20 RX ADMIN — DEXMEDETOMIDINE HYDROCHLORIDE IN 0.9% SODIUM CHLORIDE 5.2 MICROGRAM(S)/KG/HR: 4 INJECTION INTRAVENOUS at 23:42

## 2021-06-20 NOTE — PROGRESS NOTE ADULT - SUBJECTIVE AND OBJECTIVE BOX
CHIEF COMPLAINT:    Interval Events:    REVIEW OF SYSTEMS:  Constitutional: [ ] fevers [ ] chills [ ] weight loss [ ] weight gain  HEENT: [ ] dry eyes [ ] eye irritation [ ] postnasal drip [ ] nasal congestion  CV: [ ] chest pain [ ] orthopnea [ ] palpitations [ ] murmur  Resp: [ ] cough [ ] shortness of breath [ ] dyspnea [ ] wheezing [ ] sputum [ ] hemoptysis  GI: [ ] nausea [ ] vomiting [ ] diarrhea [ ] constipation [ ] abd pain [ ] dysphagia   : [ ] dysuria [ ] nocturia [ ] hematuria [ ] increased urinary frequency  Musculoskeletal: [ ] back pain [ ] myalgias [ ] arthralgias [ ] fracture  Skin: [ ] rash [ ] itch  Neurological: [ ] headache [ ] dizziness [ ] syncope [ ] weakness [ ] numbness  Hematologic/Lymphatic: [ ] anemia [ ] bleeding problem  Allergic/Immunologic: [ ] itchy eyes [ ] nasal discharge [ ] hives [ ] angioedema  [ ] All other systems negative  [ ] Unable to assess ROS because ________    OBJECTIVE:  ICU Vital Signs Last 24 Hrs  T(C): 38.2 (20 Jun 2021 05:00), Max: 38.6 (19 Jun 2021 20:15)  T(F): 100.7 (20 Jun 2021 05:00), Max: 101.5 (19 Jun 2021 20:15)  HR: 100 (20 Jun 2021 07:09) (96 - 142)  BP: 113/75 (20 Jun 2021 06:00) (81/55 - 157/120)  BP(mean): 87 (20 Jun 2021 06:00) (60 - 132)  ABP: --  ABP(mean): --  RR: 12 (20 Jun 2021 07:09) (11 - 25)  SpO2: 95% (20 Jun 2021 07:09) (87% - 100%)    Mode: AC/ CMV (Assist Control/ Continuous Mandatory Ventilation), RR (machine): 12, TV (machine): 550, FiO2: 35, PEEP: 5, ITime: 0.1, MAP: 11, PIP: 30    06-19 @ 07:01  -  06-20 @ 07:00  --------------------------------------------------------  IN: 4580.8 mL / OUT: 3910 mL / NET: 670.8 mL      CAPILLARY BLOOD GLUCOSE          PHYSICAL EXAM:  General:   HEENT:   Neck:   Respiratory:   Cardiovascular:   Abdomen:   Extremities:   Skin:   Neurological:  Psychiatry:    LINES:    HOSPITAL MEDICATIONS:  MEDICATIONS  (STANDING):  albuterol/ipratropium for Nebulization 3 milliLiter(s) Nebulizer every 4 hours  azithromycin  IVPB 500 milliGRAM(s) IV Intermittent every 24 hours  cefTRIAXone   IVPB      cefTRIAXone   IVPB 1000 milliGRAM(s) IV Intermittent every 24 hours  chlorhexidine 0.12% Liquid 15 milliLiter(s) Oral Mucosa every 12 hours  chlorhexidine 2% Cloths 1 Application(s) Topical <User Schedule>  dexMEDEtomidine Infusion 0.2 MICROgram(s)/kG/Hr (5.2 mL/Hr) IV Continuous <Continuous>  enoxaparin Injectable 40 milliGRAM(s) SubCutaneous two times a day  fentaNYL   Infusion. 0.5 MICROgram(s)/kG/Hr (5.2 mL/Hr) IV Continuous <Continuous>  glucagon  Injectable 1 milliGRAM(s) IntraMuscular once  ketamine Infusion. 0.25 mG/kG/Hr (2.38 mL/Hr) IV Continuous <Continuous>  methylPREDNISolone sodium succinate Injectable 40 milliGRAM(s) IV Push every 12 hours  polyethylene glycol 3350 17 Gram(s) Oral daily  propofol Infusion 10 MICROgram(s)/kG/Min (5.72 mL/Hr) IV Continuous <Continuous>  senna 2 Tablet(s) Oral at bedtime    MEDICATIONS  (PRN):  ALBUTerol    0.083% 2.5 milliGRAM(s) Nebulizer every 2 hours PRN Bronchospasm      LABS:                        13.4   9.98  )-----------( 173      ( 20 Jun 2021 04:25 )             43.6     Hgb Trend: 13.4<--, 13.2<--, 14.3<--, 15.2<--, 16.4<--  06-20    143  |  110<H>  |  17  ----------------------------<  115<H>  4.3   |  30  |  1.00    Ca    8.0<L>      20 Jun 2021 04:25  Phos  2.7     06-20  Mg     2.4     06-20    TPro  6.3  /  Alb  2.6<L>  /  TBili  0.3  /  DBili  x   /  AST  48<H>  /  ALT  37  /  AlkPhos  62  06-20        Arterial Blood Gas:  06-19 @ 09:01  --/53/97/33/98/7.4  ABG lactate: --        MICROBIOLOGY:     RADIOLOGY:  [ ] Reviewed and interpreted by me CHIEF COMPLAINT:    Interval Events:  Tmax 101.5    REVIEW OF SYSTEMS:  [ x] Unable to assess ROS because intubated/sedated    OBJECTIVE:  ICU Vital Signs Last 24 Hrs  T(C): 38.2 (20 Jun 2021 05:00), Max: 38.6 (19 Jun 2021 20:15)  T(F): 100.7 (20 Jun 2021 05:00), Max: 101.5 (19 Jun 2021 20:15)  HR: 100 (20 Jun 2021 07:09) (96 - 142)  BP: 113/75 (20 Jun 2021 06:00) (81/55 - 157/120)  BP(mean): 87 (20 Jun 2021 06:00) (60 - 132)  ABP: --  ABP(mean): --  RR: 12 (20 Jun 2021 07:09) (11 - 25)  SpO2: 95% (20 Jun 2021 07:09) (87% - 100%)    Mode: AC/ CMV (Assist Control/ Continuous Mandatory Ventilation), RR (machine): 12, TV (machine): 550, FiO2: 35, PEEP: 5, ITime: 0.1, MAP: 11, PIP: 30    06-19 @ 07:01  -  06-20 @ 07:00  --------------------------------------------------------  IN: 4580.8 mL / OUT: 3910 mL / NET: 670.8 mL      CAPILLARY BLOOD GLUCOSE          PHYSICAL EXAM:  General: NAD, non toxic appearing  HEENT: ETT and NGT in place  Neck: supple  Respiratory: very mild expiratory wheezing, good air movement anteriorly and decreased air movement at bases  Cardiovascular: s1s2 rrr  Abdomen: soft, non tender, non-distended  Extremities: warm, no edema or clubbing  Skin: intact  Neurological: no focal deficits  Psychiatry: unable to assess    LINES:    HOSPITAL MEDICATIONS:  MEDICATIONS  (STANDING):  albuterol/ipratropium for Nebulization 3 milliLiter(s) Nebulizer every 4 hours  azithromycin  IVPB 500 milliGRAM(s) IV Intermittent every 24 hours  cefTRIAXone   IVPB      cefTRIAXone   IVPB 1000 milliGRAM(s) IV Intermittent every 24 hours  chlorhexidine 0.12% Liquid 15 milliLiter(s) Oral Mucosa every 12 hours  chlorhexidine 2% Cloths 1 Application(s) Topical <User Schedule>  dexMEDEtomidine Infusion 0.2 MICROgram(s)/kG/Hr (5.2 mL/Hr) IV Continuous <Continuous>  enoxaparin Injectable 40 milliGRAM(s) SubCutaneous two times a day  fentaNYL   Infusion. 0.5 MICROgram(s)/kG/Hr (5.2 mL/Hr) IV Continuous <Continuous>  glucagon  Injectable 1 milliGRAM(s) IntraMuscular once  ketamine Infusion. 0.25 mG/kG/Hr (2.38 mL/Hr) IV Continuous <Continuous>  methylPREDNISolone sodium succinate Injectable 40 milliGRAM(s) IV Push every 12 hours  polyethylene glycol 3350 17 Gram(s) Oral daily  propofol Infusion 10 MICROgram(s)/kG/Min (5.72 mL/Hr) IV Continuous <Continuous>  senna 2 Tablet(s) Oral at bedtime    MEDICATIONS  (PRN):  ALBUTerol    0.083% 2.5 milliGRAM(s) Nebulizer every 2 hours PRN Bronchospasm      LABS:                        13.4   9.98  )-----------( 173      ( 20 Jun 2021 04:25 )             43.6     Hgb Trend: 13.4<--, 13.2<--, 14.3<--, 15.2<--, 16.4<--  06-20    143  |  110<H>  |  17  ----------------------------<  115<H>  4.3   |  30  |  1.00    Ca    8.0<L>      20 Jun 2021 04:25  Phos  2.7     06-20  Mg     2.4     06-20    TPro  6.3  /  Alb  2.6<L>  /  TBili  0.3  /  DBili  x   /  AST  48<H>  /  ALT  37  /  AlkPhos  62  06-20        Arterial Blood Gas:  06-19 @ 09:01  --/53/97/33/98/7.4  ABG lactate: --        MICROBIOLOGY:     RADIOLOGY:  [ ] Reviewed and interpreted by me

## 2021-06-21 LAB
ANION GAP SERPL CALC-SCNC: 5 MMOL/L — SIGNIFICANT CHANGE UP (ref 5–17)
BUN SERPL-MCNC: 26 MG/DL — HIGH (ref 7–23)
CALCIUM SERPL-MCNC: 8.5 MG/DL — SIGNIFICANT CHANGE UP (ref 8.5–10.1)
CHLORIDE SERPL-SCNC: 109 MMOL/L — HIGH (ref 96–108)
CO2 SERPL-SCNC: 30 MMOL/L — SIGNIFICANT CHANGE UP (ref 22–31)
CREAT SERPL-MCNC: 0.8 MG/DL — SIGNIFICANT CHANGE UP (ref 0.5–1.3)
CRP SERPL-MCNC: 64 MG/L — HIGH
GLUCOSE SERPL-MCNC: 115 MG/DL — HIGH (ref 70–99)
HCT VFR BLD CALC: 46 % — SIGNIFICANT CHANGE UP (ref 39–50)
HGB BLD-MCNC: 14.5 G/DL — SIGNIFICANT CHANGE UP (ref 13–17)
MAGNESIUM SERPL-MCNC: 2.7 MG/DL — HIGH (ref 1.6–2.6)
MCHC RBC-ENTMCNC: 27.1 PG — SIGNIFICANT CHANGE UP (ref 27–34)
MCHC RBC-ENTMCNC: 31.5 GM/DL — LOW (ref 32–36)
MCV RBC AUTO: 85.8 FL — SIGNIFICANT CHANGE UP (ref 80–100)
NRBC # BLD: 0 /100 WBCS — SIGNIFICANT CHANGE UP (ref 0–0)
PHOSPHATE SERPL-MCNC: 2.3 MG/DL — LOW (ref 2.5–4.5)
PLATELET # BLD AUTO: 201 K/UL — SIGNIFICANT CHANGE UP (ref 150–400)
POTASSIUM SERPL-MCNC: 3.8 MMOL/L — SIGNIFICANT CHANGE UP (ref 3.5–5.3)
POTASSIUM SERPL-SCNC: 3.8 MMOL/L — SIGNIFICANT CHANGE UP (ref 3.5–5.3)
RBC # BLD: 5.36 M/UL — SIGNIFICANT CHANGE UP (ref 4.2–5.8)
RBC # FLD: 14 % — SIGNIFICANT CHANGE UP (ref 10.3–14.5)
SODIUM SERPL-SCNC: 144 MMOL/L — SIGNIFICANT CHANGE UP (ref 135–145)
TROPONIN I SERPL-MCNC: <.015 NG/ML — SIGNIFICANT CHANGE UP (ref 0.01–0.04)
WBC # BLD: 10.4 K/UL — SIGNIFICANT CHANGE UP (ref 3.8–10.5)
WBC # FLD AUTO: 10.4 K/UL — SIGNIFICANT CHANGE UP (ref 3.8–10.5)

## 2021-06-21 PROCEDURE — 71275 CT ANGIOGRAPHY CHEST: CPT | Mod: 26

## 2021-06-21 PROCEDURE — 90792 PSYCH DIAG EVAL W/MED SRVCS: CPT

## 2021-06-21 PROCEDURE — 99233 SBSQ HOSP IP/OBS HIGH 50: CPT

## 2021-06-21 RX ORDER — IPRATROPIUM/ALBUTEROL SULFATE 18-103MCG
3 AEROSOL WITH ADAPTER (GRAM) INHALATION EVERY 6 HOURS
Refills: 0 | Status: DISCONTINUED | OUTPATIENT
Start: 2021-06-21 | End: 2021-06-22

## 2021-06-21 RX ORDER — KETOROLAC TROMETHAMINE 30 MG/ML
30 SYRINGE (ML) INJECTION ONCE
Refills: 0 | Status: DISCONTINUED | OUTPATIENT
Start: 2021-06-21 | End: 2021-06-21

## 2021-06-21 RX ORDER — ALBUTEROL 90 UG/1
1 AEROSOL, METERED ORAL EVERY 4 HOURS
Refills: 0 | Status: DISCONTINUED | OUTPATIENT
Start: 2021-06-21 | End: 2021-06-23

## 2021-06-21 RX ORDER — TIOTROPIUM BROMIDE 18 UG/1
1 CAPSULE ORAL; RESPIRATORY (INHALATION) DAILY
Refills: 0 | Status: DISCONTINUED | OUTPATIENT
Start: 2021-06-21 | End: 2021-06-23

## 2021-06-21 RX ORDER — BUDESONIDE AND FORMOTEROL FUMARATE DIHYDRATE 160; 4.5 UG/1; UG/1
2 AEROSOL RESPIRATORY (INHALATION)
Refills: 0 | Status: DISCONTINUED | OUTPATIENT
Start: 2021-06-21 | End: 2021-06-23

## 2021-06-21 RX ORDER — ALPRAZOLAM 0.25 MG
0.5 TABLET ORAL ONCE
Refills: 0 | Status: DISCONTINUED | OUTPATIENT
Start: 2021-06-21 | End: 2021-06-21

## 2021-06-21 RX ORDER — IBUPROFEN 200 MG
600 TABLET ORAL
Refills: 0 | Status: DISCONTINUED | OUTPATIENT
Start: 2021-06-21 | End: 2021-06-23

## 2021-06-21 RX ORDER — POTASSIUM PHOSPHATE, MONOBASIC POTASSIUM PHOSPHATE, DIBASIC 236; 224 MG/ML; MG/ML
15 INJECTION, SOLUTION INTRAVENOUS ONCE
Refills: 0 | Status: COMPLETED | OUTPATIENT
Start: 2021-06-21 | End: 2021-06-21

## 2021-06-21 RX ADMIN — Medication 15 MILLIGRAM(S): at 07:34

## 2021-06-21 RX ADMIN — CHLORHEXIDINE GLUCONATE 1 APPLICATION(S): 213 SOLUTION TOPICAL at 05:06

## 2021-06-21 RX ADMIN — Medication 40 MILLIGRAM(S): at 17:46

## 2021-06-21 RX ADMIN — SENNA PLUS 2 TABLET(S): 8.6 TABLET ORAL at 21:58

## 2021-06-21 RX ADMIN — Medication 15 MILLIGRAM(S): at 00:14

## 2021-06-21 RX ADMIN — Medication 40 MILLIGRAM(S): at 05:05

## 2021-06-21 RX ADMIN — Medication 2 MILLIGRAM(S): at 01:07

## 2021-06-21 RX ADMIN — ENOXAPARIN SODIUM 40 MILLIGRAM(S): 100 INJECTION SUBCUTANEOUS at 17:46

## 2021-06-21 RX ADMIN — Medication 0.5 MILLIGRAM(S): at 13:39

## 2021-06-21 RX ADMIN — DEXMEDETOMIDINE HYDROCHLORIDE IN 0.9% SODIUM CHLORIDE 5.2 MICROGRAM(S)/KG/HR: 4 INJECTION INTRAVENOUS at 05:05

## 2021-06-21 RX ADMIN — Medication 100 MILLIGRAM(S): at 08:24

## 2021-06-21 RX ADMIN — Medication 3 MILLILITER(S): at 13:36

## 2021-06-21 RX ADMIN — Medication 30 MILLIGRAM(S): at 13:39

## 2021-06-21 RX ADMIN — Medication 3 MILLILITER(S): at 09:42

## 2021-06-21 RX ADMIN — POTASSIUM PHOSPHATE, MONOBASIC POTASSIUM PHOSPHATE, DIBASIC 62.5 MILLIMOLE(S): 236; 224 INJECTION, SOLUTION INTRAVENOUS at 05:05

## 2021-06-21 RX ADMIN — Medication 100 MILLIGRAM(S): at 17:53

## 2021-06-21 RX ADMIN — Medication 3 MILLILITER(S): at 05:31

## 2021-06-21 RX ADMIN — DEXMEDETOMIDINE HYDROCHLORIDE IN 0.9% SODIUM CHLORIDE 5.2 MICROGRAM(S)/KG/HR: 4 INJECTION INTRAVENOUS at 01:08

## 2021-06-21 RX ADMIN — CEFTRIAXONE 100 MILLIGRAM(S): 500 INJECTION, POWDER, FOR SOLUTION INTRAMUSCULAR; INTRAVENOUS at 01:09

## 2021-06-21 RX ADMIN — Medication 2 MILLIGRAM(S): at 23:18

## 2021-06-21 RX ADMIN — Medication 3 MILLILITER(S): at 17:49

## 2021-06-21 RX ADMIN — BUDESONIDE AND FORMOTEROL FUMARATE DIHYDRATE 2 PUFF(S): 160; 4.5 AEROSOL RESPIRATORY (INHALATION) at 17:45

## 2021-06-21 RX ADMIN — Medication 600 MILLIGRAM(S): at 18:52

## 2021-06-21 RX ADMIN — BENZOCAINE AND MENTHOL 1 LOZENGE: 5; 1 LIQUID ORAL at 08:24

## 2021-06-21 RX ADMIN — AZITHROMYCIN 255 MILLIGRAM(S): 500 TABLET, FILM COATED ORAL at 17:45

## 2021-06-21 RX ADMIN — Medication 3 MILLILITER(S): at 01:20

## 2021-06-21 RX ADMIN — ENOXAPARIN SODIUM 40 MILLIGRAM(S): 100 INJECTION SUBCUTANEOUS at 05:05

## 2021-06-21 RX ADMIN — Medication 15 MILLIGRAM(S): at 07:49

## 2021-06-21 NOTE — SWALLOW BEDSIDE ASSESSMENT ADULT - H & P REVIEW
19 yo male w/PMH of asthma, without prior hospitalization, presents to the ED BIBEMS for SOB and wheezing associated with a mild cough since this morning. No fever, CP, leg swelling, or pain. Pt used inhaler without help, received magnesium, nebulizers, and decadron from EMS. Getting Azithromycin 500mg and Solumedrol 125 mg IVP at time of assessment in ER. Pt appeared comfortable, breathing 18-20/minute w/O2 sat 93-96% on 3L NC in no acute distress. Pt endorses suffering from asthma since he was approximately 5 years old. As of now, the only prescription he uses to manage his asthma is Albuterol, which he admits to using as much as 5x daily. Of note, he states waking up in the middle of the night ~3x/week with asthma symptoms requiring inhaler use. On assessment, patient has mild/mod bilateral wheezing, but with good air entry/movement. Pt is tachycardic 120's-130's, but likely associated with repeated albuterol doses at home, w/EMS, and in ED. Pt being placed on BiPaP at time of assessment. Pt was taken to CT and received IM epi beforehand. Pt was s/p RRT for SOB while laying flat and was emergently intubated for airway protection. Admit to ICU for further management./yes

## 2021-06-21 NOTE — PHYSICAL THERAPY INITIAL EVALUATION ADULT - MODALITIES TREATMENT COMMENTS
Patient reports RPE of 4/10 on the Nicko scale at rest and with activity. Wheezing auscultation throughout the right lung, dull throughout the left lung.

## 2021-06-21 NOTE — PHYSICAL THERAPY INITIAL EVALUATION ADULT - DISCHARGE DISPOSITION, PT EVAL
Home c home PT to improve strength and endurance for safe transfers and ambulations. Home c home PT (pending functional assessment of stairs) to improve strength and endurance for safe transfers and ambulations.

## 2021-06-21 NOTE — BH CONSULTATION LIAISON ASSESSMENT NOTE - SUMMARY
family visiting; will finish exam tomorrow  21 yo male with Cannabis use, suspecting under-report of extend of substance use, with no formal past psychiatric history,with vague description of self-identified "anxiety."    - has capacity to make his medical decisions

## 2021-06-21 NOTE — BH CONSULTATION LIAISON ASSESSMENT NOTE - RISK ASSESSMENT
Chronic risk factors: male gender, substance use. Protective factors: young; healthy; no psychiatric history; denies suicide attempts; no self-injurious behavior; no denies aggression/violence; no reported legal issues; seemingly motivated for help; stable domicile, + social support; denies access to guns. No acute risk factors identified

## 2021-06-21 NOTE — CHART NOTE - NSCHARTNOTEFT_GEN_A_CORE
patient became very anxious and hypoxic to 80's low 90's with complaint of chest tightness, and acute pain, + vomiting ( bile)   Sinus tachycardia to 140's   patient denies relief with albuterol treatment, pain medicine.   CTA negative for PE    patient with know Hx of marijuana use  and anxiety    started on precedex with somecalming effect

## 2021-06-21 NOTE — PHYSICAL THERAPY INITIAL EVALUATION ADULT - GENERAL OBSERVATIONS, REHAB EVAL
Chart reviewed. Patient seen supine in bed, NAD, (+)NC 2L, (+) heplock. Patient reports RPE of 4/10 on the Nicko scale at rest and with activity. Chart reviewed. Patient seen supine in bed, NAD, A&Ox4, (+)NC 2L, (+) heplock. Patient reports RPE of 4/10 on the Nicko scale at rest and with activity. Chart reviewed. Patient seen supine in bed, NAD, A&Ox4, (+)NC 2L, (+) heplock.

## 2021-06-21 NOTE — PROGRESS NOTE ADULT - SUBJECTIVE AND OBJECTIVE BOX
INTERVAL HPI/OVERNIGHT EVENTS:      CENTRAL LINE: [ ] YES [x ] NO  LOCATION:       RINALDI: [ x] YES [ ] NO    To be discontinued    A-LINE:  [ ] YES [ ] NO  LOCATION:       GLOBAL ISSUE/BEST PRACTICE:  Analgesia:   Sedation:  HOB elevation: yes  Stress ulcer prophylaxis: NA  VTE prophylaxis: Lovenox  Oral Care: Chlorhexidine  Glycemic control:   Nutrition:Diet, Clear Liquid (06-21-21 @ 10:00) [Active]          REVIEW OF SYSTEMS:     CONSTITUTIONAL: No fever, weight loss, or fatigue  EYES: No eye pain, visual disturbances, or discharge  ENMT:  No difficulty hearing, tinnitus, vertigo; No sinus or throat pain  NECK: No pain or stiffness  RESPIRATORY: No cough, wheezing, chills or hemoptysis; No shortness of breath  CARDIOVASCULAR: R lower rib pain, No palpitations, dizziness, or leg swelling  GASTROINTESTINAL: No abdominal or epigastric pain. No nausea, vomiting, or hematemesis; No diarrhea or constipation. No melena or hematochezia.  GENITOURINARY: No dysuria, frequency, hematuria, or incontinence  NEUROLOGICAL: No headaches, memory loss, loss of strength, numbness, or tremors  SKIN: No itching, burning, rashes, or lesions     PHYSICAL EXAM:    GENERAL: NAD, well-groomed, well-developed  EYES: EOMI, PERRLA, conjunctiva and sclera clear  NECK: Supple, No JVD, Normal thyroid  CHEST/LUNG: Clear to auscultation bilaterally with intermittent wheeze bilaterally, No rales, rhonchi, rubs  HEART: Regular rate and rhythm; No murmurs, rubs, or gallops  ABDOMEN: Soft, Nontender, Nondistended; Bowel sounds present  EXTREMITIES:  2+ Peripheral Pulses, No clubbing, cyanosis, or edema  NERVOUS SYSTEM:  Alert & Oriented X3, Good concentration; Motor Strength 5/5 B/L upper and lower extremities; DTRs 2+ intact and symmetric    ICU Vital Signs Last 24 Hrs  T(C): 37.1 (21 Jun 2021 10:00), Max: 37.1 (20 Jun 2021 16:28)  T(F): 98.8 (21 Jun 2021 10:00), Max: 98.8 (21 Jun 2021 10:00)  HR: 113 (21 Jun 2021 10:30) (86 - 161)  BP: 143/86 (21 Jun 2021 10:00) (117/106 - 160/94)  BP(mean): 103 (21 Jun 2021 10:00) (79 - 119)  ABP: --  ABP(mean): --  RR: 17 (21 Jun 2021 10:30) (12 - 28)  SpO2: 97% (21 Jun 2021 10:30) (90% - 99%)    I&O's Detail    20 Jun 2021 07:01  -  21 Jun 2021 07:00  --------------------------------------------------------  IN:    Dexmedetomidine: 200.2 mL    Dexmedetomidine: 127.3 mL    FentaNYL: 57 mL    Ketamine: 156 mL    Propofol: 168.2 mL    Vital HN: 200 mL  Total IN: 908.7 mL    OUT:    Indwelling Catheter - Urethral (mL): 1895 mL  Total OUT: 1895 mL    Total NET: -986.3 mL        MEDICATIONS  NEURO  Meds:   RESPIRATORY    Meds: ALBUTerol    0.083% 2.5 milliGRAM(s) Nebulizer every 2 hours PRN Bronchospasm  albuterol/ipratropium for Nebulization 3 milliLiter(s) Nebulizer every 4 hours  guaiFENesin Oral Liquid (Sugar-Free) 100 milliGRAM(s) Oral every 6 hours PRN Cough    CARDIOVASCULAR  Meds:   GI/NUTRITION  Meds: polyethylene glycol 3350 17 Gram(s) Oral daily  senna 2 Tablet(s) Oral at bedtime    GENITOURINARY  Meds:   HEMATOLOGIC  Meds: enoxaparin Injectable 40 milliGRAM(s) SubCutaneous two times a day    [x] VTE Prophylaxis  INFECTIOUS DISEASES  Meds: azithromycin  IVPB 500 milliGRAM(s) IV Intermittent every 24 hours  cefTRIAXone   IVPB 1000 milliGRAM(s) IV Intermittent every 24 hours  cefTRIAXone   IVPB        ENDOCRINE  CAPILLARY BLOOD GLUCOSE        Meds:   OTHER MEDICATIONS:  benzocaine 15 mG/menthol 3.6 mG (Sugar-Free) Lozenge 1 Lozenge Oral every 4 hours PRN  chlorhexidine 2% Cloths 1 Application(s) Topical <User Schedule>  :    LABS:                        14.5   10.40 )-----------( 201      ( 21 Jun 2021 03:37 )             46.0      06-21    144  |  109<H>  |  26<H>  ----------------------------<  115<H>  3.8   |  30  |  0.80    Ca    8.5      21 Jun 2021 03:37  Phos  2.3     06-21  Mg     2.7     06-21    TPro  6.3  /  Alb  2.6<L>  /  TBili  0.3  /  DBili  x   /  AST  48<H>  /  ALT  37  /  AlkPhos  62  06-20        Culture Results:   No growth to date. (06-19 @ 12:02)  Culture Results:   No growth to date. (06-19 @ 12:02)  Culture Results:   No growth at 48 hours (06-18 @ 22:59)      Mode: CPAP with PS, FiO2: 30, PEEP: 5, MAP: 7      RADIOLOGY & ADDITIONAL STUDIES:  c< from: CT Angio Chest PE Protocol w/ IV Cont (06.21.21 @ 01:47) >  EXAM:  CT ANGIO CHEST PULM [facility]                            PROCEDURE DATE:  06/21/2021          INTERPRETATION:  CLINICAL INFORMATION:    COMPARISON: Numerous prior studies most recent on 6/19/2021.    CONTRAST/COMPLICATIONS:  IV Contrast: Omnipaque 350  65 cc administered   35 cc discarded  Oral Contrast: NONE  Complications: None reported at time of study completion    PROCEDURE:  CT Angiography of the Chest.  Sagittal and coronal reformats were performed as well as 3D (MIP) reconstructions.    FINDINGS:    LUNGS AND AIRWAYS: Patent central airways.  Subsegmental atelectasis at the dependent lung bases. No suspicious pulmonary nodules. The central airways widely patent.  PLEURA: No pleural effusion.  MEDIASTINUM AND ALYSHA: No lymphadenopathy.  VESSELS: Suboptimal opacification of the pulmonary arteries. No central pulmonary embolus. Aorta is normal in course and caliber without evidence of acute abnormality.  HEART: Heart size is normal. No pericardial effusion.  CHEST WALL AND LOWER NECK: Within normal limits.  VISUALIZED UPPER ABDOMEN: Within normal limits.  BONES: Within normal limits.    IMPRESSION:    Suboptimal opacification of the pulmonary arteries. No central pulmonary embolus identified. No evidence of acute aortic syndrome.    Bibasilar subsegmental atelectasis.              KENNY SANTOS DO; Attending Radiologist  This document has been electronically signed. Jun 21 2021  2:02AM    < end of copied text >

## 2021-06-21 NOTE — BH CONSULTATION LIAISON ASSESSMENT NOTE - HPI (INCLUDE ILLNESS QUALITY, SEVERITY, DURATION, TIMING, CONTEXT, MODIFYING FACTORS, ASSOCIATED SIGNS AND SYMPTOMS)
Pt is a 19 yo male, no formal psychiatirc hx, + Cannabis use,  w/PMH of asthma, without prior hospitalization, presents to the ED BIBEMS for SOB and wheezing.   In the ED, Pt was taken to CT and received IM epi beforehand. Pt was s/p RRT for SOB while laying flat and was emergently intubated for airway protection and admitted to ICU. Extubated on 6.20.21 and downgraded. UTOX negative on admission.     ISTOP Reference #:[id number] no record found   CVM no record of patient       Pt is a 19 yo male, no formal psychiatirc hx, + Cannabis use,  w/PMH of asthma, without prior hospitalization, presents to the ED BIBEMS for SOB and wheezing.   In the ED, Pt was taken to CT and received IM epi beforehand. Pt was s/p RRT for SOB while laying flat and was emergently intubated for airway protection and admitted to ICU. Extubated on 6.20.21 and downgraded. UTOX negative on admission.     EXAM: Patient is calm, cooperative, engages but is a vague historian as to details for his self-diagnosed "anxiety." Often saying "I don't know" when asked about triggers, stressors, alleviating factors, timing and so on. Patient says he only used THC recreationally and denies other substance use but may be minimizing the use. Patient was not exactly sure how he could be helped or what he wants. Nonetheless, he accepted list of referrals for therapists that accept medicaid and are in the [place] area and outpatient psychiatric clinic info should he want to explore medications. patient at this time endorses stable euthymic mood, regular sleep / appetite; feels tired and sleepy today. Denies any symptoms of hypomania/seble/psychosis/major depression/panic. No clinical evidence of current anxiety. Denies any active or passive suicidal or homicidal ideation. Names protective factors (dioni; family; hope for future). Denies access to guns.     ISTOP Reference #:[id number] no record found   CVM no record of patient

## 2021-06-21 NOTE — BH CONSULTATION LIAISON ASSESSMENT NOTE - NSSUICPROTFACT_PSY_ALL_CORE
Responsibility to children, family, or others/Identifies reasons for living/Supportive social network of family or friends/Fear of death or the actual act of killing self/Cultural, spiritual and/or moral attitudes against suicide/Faith beliefs

## 2021-06-21 NOTE — PHYSICAL THERAPY INITIAL EVALUATION ADULT - ADDITIONAL COMMENTS
Patient lives with his mother and sister in a , with 5-6 steps to enter (B rails, close together), on 1 level. Patient has a tub/shower bath, fixed shower head, and a standard toilet seat. Patient reports no falls in the last 6 months and does not use oxygen at home.

## 2021-06-21 NOTE — SWALLOW BEDSIDE ASSESSMENT ADULT - SWALLOW EVAL: DIAGNOSIS
Pt presents WNL oral and pharyngeal swallowing skills across all consistencies trialed: puree, soft, regular solids, and thin liquids. Pt with mildly decreased activity tolerance and c/o dizziness during trials. Suggest smaller meals until stamina returns. No overt s/s of pen/asp noted. Pt noted with frequent belching during evaluation; recommend sitting and remaining upright for 1 hr following meals.

## 2021-06-21 NOTE — BH CONSULTATION LIAISON ASSESSMENT NOTE - CURRENT MEDICATION
MEDICATIONS  (STANDING):  ALBUTerol    90 MICROgram(s) HFA Inhaler 1 Puff(s) Inhalation every 4 hours  albuterol/ipratropium for Nebulization 3 milliLiter(s) Nebulizer every 6 hours  azithromycin  IVPB 500 milliGRAM(s) IV Intermittent every 24 hours  budesonide 160 MICROgram(s)/formoterol 4.5 MICROgram(s) Inhaler 2 Puff(s) Inhalation two times a day  cefTRIAXone   IVPB      cefTRIAXone   IVPB 1000 milliGRAM(s) IV Intermittent every 24 hours  chlorhexidine 2% Cloths 1 Application(s) Topical <User Schedule>  enoxaparin Injectable 40 milliGRAM(s) SubCutaneous two times a day  glucagon  Injectable 1 milliGRAM(s) IntraMuscular once  methylPREDNISolone sodium succinate Injectable 40 milliGRAM(s) IV Push every 12 hours  polyethylene glycol 3350 17 Gram(s) Oral daily  senna 2 Tablet(s) Oral at bedtime  tiotropium 18 MICROgram(s) Capsule 1 Capsule(s) Inhalation daily    MEDICATIONS  (PRN):  ALBUTerol    0.083% 2.5 milliGRAM(s) Nebulizer every 2 hours PRN Bronchospasm  benzocaine 15 mG/menthol 3.6 mG (Sugar-Free) Lozenge 1 Lozenge Oral every 4 hours PRN Sore Throat  guaiFENesin Oral Liquid (Sugar-Free) 100 milliGRAM(s) Oral every 6 hours PRN Cough  ibuprofen  Tablet. 600 milliGRAM(s) Oral four times a day PRN Severe Pain (7 - 10)

## 2021-06-21 NOTE — BH CONSULTATION LIAISON ASSESSMENT NOTE - NSBHCHARTREVIEWVS_PSY_A_CORE FT
Vital Signs Last 24 Hrs  T(C): 36.6 (21 Jun 2021 11:50), Max: 37.1 (20 Jun 2021 16:28)  T(F): 97.8 (21 Jun 2021 11:50), Max: 98.8 (21 Jun 2021 10:00)  HR: 81 (21 Jun 2021 13:43) (81 - 132)  BP: 133/82 (21 Jun 2021 11:50) (117/106 - 148/106)  BP(mean): 103 (21 Jun 2021 10:00) (79 - 119)  RR: 18 (21 Jun 2021 11:50) (12 - 22)  SpO2: 97% (21 Jun 2021 13:43) (91% - 99%)

## 2021-06-21 NOTE — CONSULT NOTE ADULT - SUBJECTIVE AND OBJECTIVE BOX
Patient is a 20y old  Male who presents with a chief complaint of asthma (2021 11:25)    HPI:  21 yo male with childhood Asthma( only on Albuterol which he was using every day), , without prior hospitalization, Smokes marijuana and Ganja on regular basis. Obesity.  Presented  to the ED  for SOB and wheezing associated with  mild cough since that  morning. No fever, CP, leg swelling, or pain.  Initially tried on BIPAP support but required incubation for worsening Respiratory status.  RVP positive for Enter/Rhino virus, elevated procalcitonin. CTA chest no PE but showed bilateral opacities.  21: Extubated.  21: transferred to medical floor.    PAST MEDICAL & SURGICAL HISTORY:  Asthma  last attack was @ age 4 yrs    No significant past surgical history    FAMILY HISTORY:  No pertinent family history in first degree relatives    SOCIAL HISTORY: Marijuana.    Allergies  No Known Allergies    MEDICATIONS  (STANDING):  ALBUTerol    90 MICROgram(s) HFA Inhaler 1 Puff(s) Inhalation every 4 hours  albuterol/ipratropium for Nebulization 3 milliLiter(s) Nebulizer every 6 hours  budesonide 160 MICROgram(s)/formoterol 4.5 MICROgram(s) Inhaler 2 Puff(s) Inhalation two times a day  cefTRIAXone   IVPB      cefTRIAXone   IVPB 1000 milliGRAM(s) IV Intermittent every 24 hours  chlorhexidine 2% Cloths 1 Application(s) Topical <User Schedule>  enoxaparin Injectable 40 milliGRAM(s) SubCutaneous two times a day  glucagon  Injectable 1 milliGRAM(s) IntraMuscular once  methylPREDNISolone sodium succinate Injectable 40 milliGRAM(s) IV Push every 12 hours  polyethylene glycol 3350 17 Gram(s) Oral daily  senna 2 Tablet(s) Oral at bedtime  tiotropium 18 MICROgram(s) Capsule 1 Capsule(s) Inhalation daily    MEDICATIONS  (PRN):  ALBUTerol    0.083% 2.5 milliGRAM(s) Nebulizer every 2 hours PRN Bronchospasm  benzocaine 15 mG/menthol 3.6 mG (Sugar-Free) Lozenge 1 Lozenge Oral every 4 hours PRN Sore Throat  guaiFENesin Oral Liquid (Sugar-Free) 100 milliGRAM(s) Oral every 6 hours PRN Cough  ibuprofen  Tablet. 600 milliGRAM(s) Oral four times a day PRN Severe Pain (7 - 10)    Vital Signs Last 24 Hrs  T(C): 36.7 (2021 16:57), Max: 37.1 (2021 10:00)  T(F): 98.1 (2021 16:57), Max: 98.8 (2021 10:00)  HR: 95 (2021 17:53) (81 - 132)  BP: 141/99 (2021 16:57) (119/63 - 148/106)  BP(mean): 103 (2021 10:00) (79 - 119)  RR: 18 (2021 16:57) (12 - 22)  SpO2: 97% (2021 17:53) (91% - 99%)    PHYSICAL EXAM:  GEN:         Awake, responsive and comfortable.  HEENT:    Normal.    RESP:        air entry.  CVS:             Regular rate and rhythm.   ABD:         Soft, non-tender, non-distended;   SKIN:           Warm and dry.  EXTR:            No clubbing, cyanosis or edema  CNS:              Intact sensory and motor function.  PSYCH:        cooperative, no anxiety or depression    LABS:                        14.5   10.40 )-----------( 201      ( 2021 03:37 )             46.0     06-21    144  |  109<H>  |  26<H>  ----------------------------<  115<H>  3.8   |  30  |  0.80    Ca    8.5      2021 03:37  Phos  2.3       Mg     2.7         TPro  6.3  /  Alb  2.6<L>  /  TBili  0.3  /  DBili  x   /  AST  48<H>  /  ALT  37  /  AlkPhos  62  -    06-19 @ 09:01  pH: 7.41  pCO2: 53  pO2: 97  SaO2: 98   @ 08:25  pH: 7.35  pCO2: 56  pO2: 77  SaO2: 96   @ 03:21  pH: 7.27  pCO2: 55  pO2: 76  SaO2: 95   @ 01:08  pH: 7.22  pCO2: 60  pO2: 81  SaO2: 95   @ 22:10  pH: 7.21  pCO2: 62  pO2: 204  SaO2: 99   @ 18:21  pH: 7.31  pCO2: 45  pO2: 205  SaO2: 99    Culture - Blood (collected 21 @ 12:02)  Source: .Blood Blood  Preliminary Report (21 @ 13:01):    No growth to date.    Culture - Blood (collected 21 @ 12:02)  Source: .Blood Blood  Preliminary Report (21 @ 13:01):    No growth to date.    Culture - Sputum (collected 21 @ 22:59)  Source: .Sputum Sputum  Gram Stain (21 @ 17:04):    Moderate polymorphonuclear leukocytes per low power field    Rare Squamous epithelial cells per low power field    Rare Gram positive cocci in pairs per oil power field    Rare Gram Negative Rods per oil power field  Final Report (21 @ 17:04):    No growth at 48 hours    Culture - Urine (collected 21 @ 16:44)  Source: .Urine Catheterized  Final Report (21 @ 14:02):    No growth    RADIOLOGY & ADDITIONAL STUDIES:  < from: CT Angio Chest PE Protocol w/ IV Cont (21 @ 01:47) >  EXAM:  CT ANGIO CHEST PULM ART [facility]                          PROCEDURE DATE:  2021      INTERPRETATION:  CLINICAL INFORMATION:    COMPARISON: Numerous prior studies most recent on 2021.    CONTRAST/COMPLICATIONS:  IV Contrast: Omnipaque 350  65 cc administered   35 cc discarded  Oral Contrast: NONE  Complications: None reported at time of study completion    PROCEDURE:  CT Angiography of the Chest.  Sagittal and coronal reformats were performed as well as 3D (MIP) reconstructions.    FINDINGS:    LUNGS AND AIRWAYS: Patent central airways.  Subsegmental atelectasis at the dependent lung bases. No suspicious pulmonary nodules. The central airways widely patent.  PLEURA: No pleural effusion.  MEDIASTINUM AND ALYSHA: No lymphadenopathy.  VESSELS: Suboptimal opacification of the pulmonary arteries. No central pulmonary embolus. Aorta is normal in course and caliber without evidence of acute abnormality.  HEART: Heart size is normal. No pericardial effusion.  CHEST WALL AND LOWER NECK: Within normal limits.  VISUALIZED UPPER ABDOMEN: Within normal limits.  BONES: Within normal limits.    IMPRESSION:    Suboptimal opacification of the pulmonary arteries. No central pulmonary embolus identified. No evidence of acute aortic syndrome.    Bibasilar subsegmental atelectasis.    KENNY SANTOS DO; Attending Radiologist  This document has been electronically signed. 2021  2:02AM    ASSESSMENT AND PLAN:  ·	S/P Respiratory failure  ·	Moderate persistent Asthma with acute exacerbation.  ·	Rhino virus tracheobronchitis.  ·	Possible Pneumonia.  ·	Marijuana abuse.  ·	Obesity.    Continue O2, steroids and nebulizer.  Continue Symbicort.  To complete antibiotics tomorrow.  Incentive spirometry.  Weight reduction.  DVT prophylaxis.  Discussed with sister tomorrow.  Patient is a 20y old  Male who presents with a chief complaint of asthma (2021 11:25)    HPI:  19 yo male with childhood Asthma( only on Albuterol which he was using every day), , without prior hospitalization, Smokes marijuana and Ganja on regular basis. Obesity.  Presented  to the ED  for SOB and wheezing associated with  mild cough since that  morning. No fever, CP, leg swelling, or pain.  Initially tried on BIPAP support but required incubation for worsening Respiratory status.  RVP positive for Enter/Rhino virus, elevated procalcitonin. CTA chest no PE but showed bilateral opacities.  21: Extubated.  21: transferred to medical floor.    PAST MEDICAL & SURGICAL HISTORY:  Asthma  last attack was @ age 4 yrs    No significant past surgical history    FAMILY HISTORY:  No pertinent family history in first degree relatives    SOCIAL HISTORY: Marijuana.    Allergies  No Known Allergies    MEDICATIONS  (STANDING):  ALBUTerol    90 MICROgram(s) HFA Inhaler 1 Puff(s) Inhalation every 4 hours  albuterol/ipratropium for Nebulization 3 milliLiter(s) Nebulizer every 6 hours  budesonide 160 MICROgram(s)/formoterol 4.5 MICROgram(s) Inhaler 2 Puff(s) Inhalation two times a day  cefTRIAXone   IVPB      cefTRIAXone   IVPB 1000 milliGRAM(s) IV Intermittent every 24 hours  chlorhexidine 2% Cloths 1 Application(s) Topical <User Schedule>  enoxaparin Injectable 40 milliGRAM(s) SubCutaneous two times a day  glucagon  Injectable 1 milliGRAM(s) IntraMuscular once  methylPREDNISolone sodium succinate Injectable 40 milliGRAM(s) IV Push every 12 hours  polyethylene glycol 3350 17 Gram(s) Oral daily  senna 2 Tablet(s) Oral at bedtime  tiotropium 18 MICROgram(s) Capsule 1 Capsule(s) Inhalation daily    MEDICATIONS  (PRN):  ALBUTerol    0.083% 2.5 milliGRAM(s) Nebulizer every 2 hours PRN Bronchospasm  benzocaine 15 mG/menthol 3.6 mG (Sugar-Free) Lozenge 1 Lozenge Oral every 4 hours PRN Sore Throat  guaiFENesin Oral Liquid (Sugar-Free) 100 milliGRAM(s) Oral every 6 hours PRN Cough  ibuprofen  Tablet. 600 milliGRAM(s) Oral four times a day PRN Severe Pain (7 - 10)    Vital Signs Last 24 Hrs  T(C): 36.7 (2021 16:57), Max: 37.1 (2021 10:00)  T(F): 98.1 (2021 16:57), Max: 98.8 (2021 10:00)  HR: 95 (2021 17:53) (81 - 132)  BP: 141/99 (2021 16:57) (119/63 - 148/106)  BP(mean): 103 (2021 10:00) (79 - 119)  RR: 18 (2021 16:57) (12 - 22)  SpO2: 97% (2021 17:53) (91% - 99%)    PHYSICAL EXAM:  GEN:         Awake, responsive and comfortable.  HEENT:    Normal.    RESP:        air entry.  CVS:             Regular rate and rhythm.   ABD:         Soft, non-tender, non-distended;   SKIN:           Warm and dry.  EXTR:            No clubbing, cyanosis or edema  CNS:              Intact sensory and motor function.  PSYCH:        cooperative, no anxiety or depression    LABS:                        14.5   10.40 )-----------( 201      ( 2021 03:37 )             46.0     06-21    144  |  109<H>  |  26<H>  ----------------------------<  115<H>  3.8   |  30  |  0.80    Ca    8.5      2021 03:37  Phos  2.3       Mg     2.7         TPro  6.3  /  Alb  2.6<L>  /  TBili  0.3  /  DBili  x   /  AST  48<H>  /  ALT  37  /  AlkPhos  62  -    06-19 @ 09:01  pH: 7.41  pCO2: 53  pO2: 97  SaO2: 98   @ 08:25  pH: 7.35  pCO2: 56  pO2: 77  SaO2: 96   @ 03:21  pH: 7.27  pCO2: 55  pO2: 76  SaO2: 95   @ 01:08  pH: 7.22  pCO2: 60  pO2: 81  SaO2: 95   @ 22:10  pH: 7.21  pCO2: 62  pO2: 204  SaO2: 99   @ 18:21  pH: 7.31  pCO2: 45  pO2: 205  SaO2: 99    Culture - Blood (collected 21 @ 12:02)  Source: .Blood Blood  Preliminary Report (21 @ 13:01):    No growth to date.    Culture - Blood (collected 21 @ 12:02)  Source: .Blood Blood  Preliminary Report (21 @ 13:01):    No growth to date.    Culture - Sputum (collected 21 @ 22:59)  Source: .Sputum Sputum  Gram Stain (21 @ 17:04):    Moderate polymorphonuclear leukocytes per low power field    Rare Squamous epithelial cells per low power field    Rare Gram positive cocci in pairs per oil power field    Rare Gram Negative Rods per oil power field  Final Report (21 @ 17:04):    No growth at 48 hours    Culture - Urine (collected 21 @ 16:44)  Source: .Urine Catheterized  Final Report (21 @ 14:02):    No growth    RADIOLOGY & ADDITIONAL STUDIES:  < from: CT Angio Chest PE Protocol w/ IV Cont (21 @ 01:47) >  EXAM:  CT ANGIO CHEST PULM ART [facility]                          PROCEDURE DATE:  2021      INTERPRETATION:  CLINICAL INFORMATION:    COMPARISON: Numerous prior studies most recent on 2021.    CONTRAST/COMPLICATIONS:  IV Contrast: Omnipaque 350  65 cc administered   35 cc discarded  Oral Contrast: NONE  Complications: None reported at time of study completion    PROCEDURE:  CT Angiography of the Chest.  Sagittal and coronal reformats were performed as well as 3D (MIP) reconstructions.    FINDINGS:    LUNGS AND AIRWAYS: Patent central airways.  Subsegmental atelectasis at the dependent lung bases. No suspicious pulmonary nodules. The central airways widely patent.  PLEURA: No pleural effusion.  MEDIASTINUM AND ALYSHA: No lymphadenopathy.  VESSELS: Suboptimal opacification of the pulmonary arteries. No central pulmonary embolus. Aorta is normal in course and caliber without evidence of acute abnormality.  HEART: Heart size is normal. No pericardial effusion.  CHEST WALL AND LOWER NECK: Within normal limits.  VISUALIZED UPPER ABDOMEN: Within normal limits.  BONES: Within normal limits.    IMPRESSION:    Suboptimal opacification of the pulmonary arteries. No central pulmonary embolus identified. No evidence of acute aortic syndrome.    Bibasilar subsegmental atelectasis.    KENNY SANTOS DO; Attending Radiologist  This document has been electronically signed. 2021  2:02AM    ASSESSMENT AND PLAN:  ·	S/P Respiratory failure  ·	Moderate persistent Asthma with acute exacerbation.  ·	Rhino virus tracheobronchitis.  ·	Possible Pneumonia.  ·	Marijuana abuse.  ·	Obesity.    Continue O2, steroids and nebulizer.  Continue Symbicort.  To complete antibiotics tomorrow.  Incentive spirometry.  Weight reduction.  DVT prophylaxis.  Discussed with sister bedside.

## 2021-06-21 NOTE — BH CONSULTATION LIAISON ASSESSMENT NOTE - NSBHCONSULTRECOMMENDOTHER_PSY_A_CORE FT
Patient was given list of referrals for therapists that accept his insurance (Medicaid) and are in the [place] area where he lives; also, outpatient psychiatric clinic info should he want to explore medications was also provided ([facility] outpatient mental health clinic). Patient encouraged to return to ED or call 911 should he feel worst/SI etc

## 2021-06-21 NOTE — SWALLOW BEDSIDE ASSESSMENT ADULT - COMMENTS
CXR 6/18/21: IMPRESSION:   No evidence of active chest disease.  CT Chest: 6/21: IMPRESSION: Suboptimal opacification of the pulmonary arteries. No central pulmonary embolus identified. No evidence of acute aortic syndrome. Bibasilar subsegmental atelectasis.

## 2021-06-21 NOTE — PHYSICAL THERAPY INITIAL EVALUATION ADULT - PERTINENT HX OF CURRENT PROBLEM, REHAB EVAL
Patient BIBEMS on 6/16/2021 for an asthma exacerbation due to entero/rhinovirus. Patient experienced SOB, wheezing, and mild cough. EKG at admit shows sinus tachycardia. CT angio chest shows suboptimal opacification of pulmonary arteries, bibasilar subsegmental atelectasis. No indications of PE. Psychiatry is following for suspected anxiety. Patient BIBEMS on 6/16/2021 for an asthma exacerbation due to the entero/rhinovirus. Patient experienced SOB, wheezing, and mild cough. EKG at admit shows sinus tachycardia. CT angio chest shows suboptimal opacification of pulmonary arteries, bibasilar subsegmental atelectasis. No indications of PE. Psychiatry is following for suspected anxiety.

## 2021-06-21 NOTE — CHART NOTE - NSCHARTNOTEFT_GEN_A_CORE
20year old man with a history of asthma. Admitted with asthma exacerbation due to entero/rhino virus complicated by status asthmaticus and respiratory failure requiring intubation. 20 year old man with a history of asthma. Admitted on 6/16/21 with asthma exacerbation due to entero/rhino virus and complicated by status asthmaticus and respiratory failure requiring intubation. Found to have small Left PTX and b/l upper lobe opacities, which have improved. Patient was extubated on 6/20/21. Patient has strong cough. CTA done yesterday for episode of desaturation and chest discomfort. CT scan reported no PE. Bibasilar subsegmental atelectasis seen. Patient is alert and oriented, currently not in pain. Remains HD stable. Respiratory status improved but with has some mild wheezing. Will continue with solmedrol 40 mg q12 and duonebs q4. Dr. Knott from Pulmonary was consulted for further recommendations. Patient is on CAP coverage with ceftriaxone and azithromycin. Abx will end tomorrow. Sputum cx and urine legionella negative. On incentive spirometer. Simpson removed this morning, please follow TOV. Patient currently on clear diet because he was nauseous. Abdomen is soft. On lovenox bid (based on BMI) for ppx. Patient was seen and examined by ICU attending and deemed stable for transfer to the med/surg floor. Patient signed out to Dr. Gorman. Will go under Dr. Serrato. Patient informed of the plan.

## 2021-06-21 NOTE — PHYSICAL THERAPY INITIAL EVALUATION ADULT - BALANCE TRAINING, PT EVAL
GOAL: Patient will demonstrate independent performance of ADLS c low falls risk with appropriate mobility/adaptive devices in 2-3 weeks.

## 2021-06-21 NOTE — BH CONSULTATION LIAISON ASSESSMENT NOTE - NSBHCHARTREVIEWLAB_PSY_A_CORE FT
06-22    143  |  105  |  22  ----------------------------<  93  3.5   |  29  |  0.80    Ca    8.8      22 Jun 2021 06:56  Phos  1.9     06-22  Mg     2.5     06-22    TPro  7.3  /  Alb  3.1<L>  /  TBili  0.7  /  DBili  x   /  AST  121<H>  /  ALT  111<H>  /  AlkPhos  73  06-22

## 2021-06-21 NOTE — PHYSICAL THERAPY INITIAL EVALUATION ADULT - BED MOBILITY TRAINING, PT EVAL
GOAL: Patient will demonstrate independent bed mobility with safe and efficient technique in 2-3 weeks.

## 2021-06-21 NOTE — PHYSICAL THERAPY INITIAL EVALUATION ADULT - GAIT TRAINING, PT EVAL
GOAL: Patient will demonstrate independent and safe gait indoors with appropriate mobility/adaptive devices for =/> 500 feet, in 2-3 weeks.

## 2021-06-22 LAB
ALBUMIN SERPL ELPH-MCNC: 3.1 G/DL — LOW (ref 3.3–5)
ALP SERPL-CCNC: 73 U/L — SIGNIFICANT CHANGE UP (ref 40–120)
ALT FLD-CCNC: 111 U/L — HIGH (ref 12–78)
ANION GAP SERPL CALC-SCNC: 9 MMOL/L — SIGNIFICANT CHANGE UP (ref 5–17)
AST SERPL-CCNC: 121 U/L — HIGH (ref 15–37)
BILIRUB SERPL-MCNC: 0.7 MG/DL — SIGNIFICANT CHANGE UP (ref 0.2–1.2)
BUN SERPL-MCNC: 22 MG/DL — SIGNIFICANT CHANGE UP (ref 7–23)
CALCIUM SERPL-MCNC: 8.8 MG/DL — SIGNIFICANT CHANGE UP (ref 8.5–10.1)
CHLORIDE SERPL-SCNC: 105 MMOL/L — SIGNIFICANT CHANGE UP (ref 96–108)
CO2 SERPL-SCNC: 29 MMOL/L — SIGNIFICANT CHANGE UP (ref 22–31)
CREAT SERPL-MCNC: 0.8 MG/DL — SIGNIFICANT CHANGE UP (ref 0.5–1.3)
GLUCOSE SERPL-MCNC: 93 MG/DL — SIGNIFICANT CHANGE UP (ref 70–99)
HCT VFR BLD CALC: 48.1 % — SIGNIFICANT CHANGE UP (ref 39–50)
HGB BLD-MCNC: 15.1 G/DL — SIGNIFICANT CHANGE UP (ref 13–17)
MAGNESIUM SERPL-MCNC: 2.5 MG/DL — SIGNIFICANT CHANGE UP (ref 1.6–2.6)
MCHC RBC-ENTMCNC: 27.4 PG — SIGNIFICANT CHANGE UP (ref 27–34)
MCHC RBC-ENTMCNC: 31.4 GM/DL — LOW (ref 32–36)
MCV RBC AUTO: 87.3 FL — SIGNIFICANT CHANGE UP (ref 80–100)
NRBC # BLD: 0 /100 WBCS — SIGNIFICANT CHANGE UP (ref 0–0)
PHOSPHATE SERPL-MCNC: 1.9 MG/DL — LOW (ref 2.5–4.5)
PLATELET # BLD AUTO: 223 K/UL — SIGNIFICANT CHANGE UP (ref 150–400)
POTASSIUM SERPL-MCNC: 3.5 MMOL/L — SIGNIFICANT CHANGE UP (ref 3.5–5.3)
POTASSIUM SERPL-SCNC: 3.5 MMOL/L — SIGNIFICANT CHANGE UP (ref 3.5–5.3)
PROT SERPL-MCNC: 7.3 GM/DL — SIGNIFICANT CHANGE UP (ref 6–8.3)
RBC # BLD: 5.51 M/UL — SIGNIFICANT CHANGE UP (ref 4.2–5.8)
RBC # FLD: 13.6 % — SIGNIFICANT CHANGE UP (ref 10.3–14.5)
SODIUM SERPL-SCNC: 143 MMOL/L — SIGNIFICANT CHANGE UP (ref 135–145)
WBC # BLD: 11.4 K/UL — HIGH (ref 3.8–10.5)
WBC # FLD AUTO: 11.4 K/UL — HIGH (ref 3.8–10.5)

## 2021-06-22 PROCEDURE — 99232 SBSQ HOSP IP/OBS MODERATE 35: CPT

## 2021-06-22 RX ORDER — POTASSIUM PHOSPHATE, MONOBASIC POTASSIUM PHOSPHATE, DIBASIC 236; 224 MG/ML; MG/ML
15 INJECTION, SOLUTION INTRAVENOUS ONCE
Refills: 0 | Status: COMPLETED | OUTPATIENT
Start: 2021-06-22 | End: 2021-06-22

## 2021-06-22 RX ORDER — IPRATROPIUM BROMIDE 0.2 MG/ML
500 SOLUTION, NON-ORAL INHALATION EVERY 6 HOURS
Refills: 0 | Status: DISCONTINUED | OUTPATIENT
Start: 2021-06-22 | End: 2021-06-23

## 2021-06-22 RX ORDER — LANOLIN ALCOHOL/MO/W.PET/CERES
3 CREAM (GRAM) TOPICAL AT BEDTIME
Refills: 0 | Status: DISCONTINUED | OUTPATIENT
Start: 2021-06-22 | End: 2021-06-23

## 2021-06-22 RX ADMIN — ENOXAPARIN SODIUM 40 MILLIGRAM(S): 100 INJECTION SUBCUTANEOUS at 05:52

## 2021-06-22 RX ADMIN — CEFTRIAXONE 100 MILLIGRAM(S): 500 INJECTION, POWDER, FOR SOLUTION INTRAMUSCULAR; INTRAVENOUS at 00:47

## 2021-06-22 RX ADMIN — BUDESONIDE AND FORMOTEROL FUMARATE DIHYDRATE 2 PUFF(S): 160; 4.5 AEROSOL RESPIRATORY (INHALATION) at 17:12

## 2021-06-22 RX ADMIN — Medication 100 MILLIGRAM(S): at 11:05

## 2021-06-22 RX ADMIN — ENOXAPARIN SODIUM 40 MILLIGRAM(S): 100 INJECTION SUBCUTANEOUS at 17:11

## 2021-06-22 RX ADMIN — Medication 500 MICROGRAM(S): at 23:15

## 2021-06-22 RX ADMIN — BUDESONIDE AND FORMOTEROL FUMARATE DIHYDRATE 2 PUFF(S): 160; 4.5 AEROSOL RESPIRATORY (INHALATION) at 05:52

## 2021-06-22 RX ADMIN — ALBUTEROL 2.5 MILLIGRAM(S): 90 AEROSOL, METERED ORAL at 10:25

## 2021-06-22 RX ADMIN — Medication 40 MILLIGRAM(S): at 17:11

## 2021-06-22 RX ADMIN — Medication 100 MILLIGRAM(S): at 17:18

## 2021-06-22 RX ADMIN — Medication 3 MILLILITER(S): at 05:25

## 2021-06-22 RX ADMIN — Medication 3 MILLILITER(S): at 00:11

## 2021-06-22 RX ADMIN — Medication 40 MILLIGRAM(S): at 05:52

## 2021-06-22 RX ADMIN — CHLORHEXIDINE GLUCONATE 1 APPLICATION(S): 213 SOLUTION TOPICAL at 05:53

## 2021-06-22 RX ADMIN — Medication 500 MICROGRAM(S): at 17:32

## 2021-06-22 RX ADMIN — Medication 3 MILLIGRAM(S): at 21:48

## 2021-06-22 RX ADMIN — POLYETHYLENE GLYCOL 3350 17 GRAM(S): 17 POWDER, FOR SOLUTION ORAL at 11:00

## 2021-06-22 RX ADMIN — SENNA PLUS 2 TABLET(S): 8.6 TABLET ORAL at 21:48

## 2021-06-22 RX ADMIN — Medication 600 MILLIGRAM(S): at 12:12

## 2021-06-22 RX ADMIN — POTASSIUM PHOSPHATE, MONOBASIC POTASSIUM PHOSPHATE, DIBASIC 62.5 MILLIMOLE(S): 236; 224 INJECTION, SOLUTION INTRAVENOUS at 10:44

## 2021-06-22 NOTE — PROGRESS NOTE ADULT - ASSESSMENT
21 yo M with asthma a/w asthma exacerbation 2/2 entero/rhino virus complicated by status asthmaticus and respiratory failure requiring intubation. Found to have small L PTX and b/l upper lobe opacities, which have improved. Patient was extubated on 6/20.      Neuro: No acute issues.  CV: S/p CT angio overnight for chest pain, negative for central PE.    Pulm: Status Asthmaticus s/p intubation - continue with duonebs, and solumedrol, will require solumedrol taper, can transition to prednisone.  Pulm consult on floors.   GI: intermittent nausea and vomiting, s/p zofran.  No abdominal tenderness, abd soft on exam.  will continue to monitor.   Renal/Metabolic: No acute issues  ID: Continue with CAP coverage for 5 day course  Heme: No acute issues  Dispo: Stable for transfer to medicine.      
20M PMH asthma, without prior hospitalizations for asthma or intubations presents to the ED for SOB and wheezing associated with a mild cough unrelieved by inhaler use at home. s/p nebs, magnesium, and decadron by EMS. Initially comfortable in ED, breathing 18-20/minute w/O2 sat 93-96% on 3L NC in no acute distress. Pt reportedly admits to using albuterol as much as 5x daily. He states waking up in the middle of the night ~3x/week with asthma symptoms requiring inhaler use. s/p RRT for SOB while laying flat in CT scan and was emergently intubated for airway protection. Admit to ICU for moderate persistent asthma with acute asthma exacerbation/status asthmaticus due to viral URI requiring intubation for acute respiratory failure. Course complicated by small left PTX post intubation and new bilateral upper lobe opacities/consolidation.     NEURO  required heavy sedation and paralytics to synchronize with ventilator  weaned off nimbex today, remains on precedx, propofol, and ketamine for sedation  ketamine for sedation but also for bronchodilation effects    PULM  cont solumedrol  around the clock duonebs: will increased frequency to q4 hours  add hypertonic saline nebs to help with secretion mobilization and any mucus plug  if peak pressures and CXR upper lobe consolidations does not improve will consider bronchoscopy for airway exam and possible airway clearance  no need for chest tube with small apical left PTX  monitor  repeat CXR in AM  peak pressures in the 40s, plateau pressure in the 20s  supportive care for enteroviral infection  cont on CAP coverage with azithromycin and ceftriaxone    GEN  GI/DVT prophylaxis  NGT inserted for tube feeds  sisters Keily and Beverly updated at bedside, all questions answered for family  pt critically ill    Critical Care Time: 60 min not including procedures    
ICU transfer summary: 20 year old man with a history of asthma. Admitted on 6/16/21 with asthma exacerbation due to entero/rhino virus and complicated by status asthmaticus and respiratory failure requiring intubation. Found to have small Left PTX and b/l upper lobe opacities, which have improved. Patient was extubated on 6/20/21. Patient has strong cough. CTA done yesterday for episode of desaturation and chest discomfort. CT scan reported no PE. Bibasilar subsegmental atelectasis seen. Patient is alert and oriented, currently not in pain. Remains HD stable. Respiratory status improved but with has some mild wheezing. Will continue with solmedrol 40 mg q12 and duonebs q4. Dr. Knott from Pulmonary was consulted for further recommendations. Patient is on CAP coverage with ceftriaxone and azithromycin. Abx will end tomorrow. Sputum cx and urine legionella negative. On incentive spirometer. Simpson removed this morning, please follow TOV. Patient currently on clear diet because he was nauseous. Abdomen is soft. On lovenox bid (based on BMI) for ppx. Patient was seen and examined by ICU attending and deemed stable for transfer to the med/surg floor.    Acute hypoxic respiratory failure with acute asthma exacerbation ( caused by rhino/entero virus infection). improving. s/p extubation on 6/20/21. still on NC oxygen at rest. discussed with the nurse and patient to taper down oxygen if possible. cont iv steroids and bronchodilator inhalers. Pulm following. finished antibiotic course. encourage to use incentive spirometry.   cont constipation ppx.   Drug abuse. counseling provided  Obesity class 2. nutrition counseling and exercise.     Full code  DVT ppx. 
20M w/ asthma. Admitted w/ asthma exacerbation due to entero/rhino virus complicated by status asthmaticus and respiratory failure requiring intubation. Found to have small L PTX and b/l upper lobe opacities, which have improved.     #Neuro - remains on propofol, fentanyl, ketamine and precedex; will start weaning sedation when family arrives  #CV - HD stable   #Pulm - admitted w/ asthma exacerbation/status asthmaticus and was intubated; respiratory mechanics have improved w/ expiratory limb reaching baseline and peak pressures decreasing to ~30, exam as improved significantly; continue w/ solmedrol 40 mg q12 for now; continue w/ duonebs q4; will start weaning off sedatives w/ intention to extubate when family arrives  #ID- on CAP coverage w/ ceftriaxone and azithromycin; plan for -7 day course; sputum cx and urine legionella negative; spiking temperature but WBC count normal and HD stable so will not expand abx coverage  #Renal/metabolic - normal renal function, on free water for hypernatremia  #GI- continue w/ TF and bowel regimen  #Heme - lovenox for ppx  #Endo - FS acceptable  #PPx - lovenox bid (based on BMI) for ppx  #Dispo- remains in ICU ventilatory dependent; prognosis guarded    
20M w/ asthma. Admitted w/ asthma exacerbation due to entero/rhino virus complicated by status asthmaticus and respiratory failure requiring intubation. Found to have small L PTX and b/l upper lobe opacities.     #Neuro - remains on propofol, fentanyl and ketamine for sedation and vent synchrony; off paralytics; hold on SAT today  #CV - HD stable   #Pulm - admitted w/ asthma exacerbation/status asthmaticus and was intubated; remains with high peak pressures and pplat ~30 consistent w/ obstructive lung disease, still with wheezing and prolonged expiration on exam; will decrease steroid frequency to solmedrol 40 mg q12 for now; continue w/ duonebs q4 and hypersal to mobilize secretions; repeat CXR to eval PTX and consolidations pending; given pt is still wheezing today with poor air movement at the bases will keep pt sedated on vent today and re-assess for ability to wean tomorrow  #ID- on CAP coverage w/ ceftriaxone and azithromycin; plan for 5-7 day course; send sputum cx, urine legionella  #Renal/metabolic - normal renal function, start free water for hypernatremia  #GI- start TF and bowel regimen  #Heme - lovenox for ppx  #Endo - FS acceptable  #PPx - lovenox bid (based on BMI) for ppx  #Dispo- remains in ICU ventilatory dependent; prognosis guarded          
20M w/ asthma. Admitted w/ asthma exacerbation due to entero/rhino virus complicated by status asthmaticus and respiratory failure requiring intubation. Found to have small L PTX and b/l upper lobe opacities, which have improved    #Neuro - remains on propofol, fentanyl, ketamine and precedex; will try to come down on fentanyl  #CV - HD stable   #Pulm - admitted w/ asthma exacerbation/status asthmaticus and was intubated; respiratory mechanics have improved w/ expirtiry limb reaching baseline and peak pressures decreasing to ~30, still with some expiratory wheezing and prolonged expiration on exam, but improving; continue w/ solmedrol 40 mg q12 for now; continue w/ duonebs q4; d/c hypersal; CXR yesterday showed no PTX and upper lobe consolidations resolved as well; will likely aim to wean tomorrow when family present  #ID- on CAP coverage w/ ceftriaxone and azithromycin; plan for 5-7 day course; send sputum cx, urine legionella  #Renal/metabolic - normal renal function, on free water for hypernatremia  #GI- continue w/ TF and bowel regimen  #Heme - lovenox for ppx  #Endo - FS acceptable  #PPx - lovenox bid (based on BMI) for ppx  #Dispo- remains in ICU ventilatory dependent; prognosis guarded

## 2021-06-22 NOTE — DISCHARGE NOTE PROVIDER - NSDCFUADDINST_GEN_ALL_CORE_FT
1) It is important to see your primary physician as well as other necessary consultants within the next week to perform a comprehensive medical review.  Call their offices for an appointment as soon as you leave the hospital.  If you do not have a primary physician or unable to reach your PCP, contact the [facility] Physician Referral Service at [phone].  Your medical issues appear to be stable at this time, but if your symptoms recur or worsen, contact your physicians and/or return to the hospital if necessary.  If you encounter any issues or questions with your medication, call your physicians before stopping the medication.

## 2021-06-22 NOTE — PROGRESS NOTE ADULT - SUBJECTIVE AND OBJECTIVE BOX
INTERVAL HPI:  19 yo male with childhood Asthma( only on Albuterol which he was using every day), , without prior hospitalization, Smokes marijuana and Ganja on regular basis. Obesity.  Presented  to the ED  for SOB and wheezing associated with  mild cough since that  morning. No fever, CP, leg swelling, or pain.  Initially tried on BIPAP support but required incubation for worsening Respiratory status.  RVP positive for Enter/Rhino virus, elevated procalcitonin. CTA chest no PE but showed bilateral opacities.  06/20/21: Extubated.  06/21/21: transferred to medical floor.    OVERNIGHT EVENTS:  Out of be to chair. Still feels tight in chest but is nervous.    Vital Signs Last 24 Hrs  T(C): 36.6 (22 Jun 2021 10:47), Max: 36.7 (21 Jun 2021 16:57)  T(F): 97.8 (22 Jun 2021 10:47), Max: 98.1 (21 Jun 2021 16:57)  HR: 107 (22 Jun 2021 10:47) (81 - 116)  BP: 137/96 (22 Jun 2021 10:47) (126/85 - 141/99)  BP(mean): --  RR: 18 (22 Jun 2021 10:47) (18 - 18)  SpO2: 97% (22 Jun 2021 10:47) (93% - 100%)    PHYSICAL EXAM:  GEN:         Awake, responsive and comfortable.  HEENT:    Normal.    RESP:       no distress  CVS:          Regular rate and rhythm.   ABD:         Soft, non-tender, non-distended;     MEDICATIONS  (STANDING):  ALBUTerol    90 MICROgram(s) HFA Inhaler 1 Puff(s) Inhalation every 4 hours  budesonide 160 MICROgram(s)/formoterol 4.5 MICROgram(s) Inhaler 2 Puff(s) Inhalation two times a day  chlorhexidine 2% Cloths 1 Application(s) Topical <User Schedule>  enoxaparin Injectable 40 milliGRAM(s) SubCutaneous two times a day  glucagon  Injectable 1 milliGRAM(s) IntraMuscular once  ipratropium    for Nebulization 500 MICROGram(s) Nebulizer every 6 hours  methylPREDNISolone sodium succinate Injectable 40 milliGRAM(s) IV Push every 12 hours  polyethylene glycol 3350 17 Gram(s) Oral daily  senna 2 Tablet(s) Oral at bedtime  tiotropium 18 MICROgram(s) Capsule 1 Capsule(s) Inhalation daily    MEDICATIONS  (PRN):  benzocaine 15 mG/menthol 3.6 mG (Sugar-Free) Lozenge 1 Lozenge Oral every 4 hours PRN Sore Throat  guaiFENesin Oral Liquid (Sugar-Free) 100 milliGRAM(s) Oral every 6 hours PRN Cough  ibuprofen  Tablet. 600 milliGRAM(s) Oral four times a day PRN Severe Pain (7 - 10)    LABS:                        15.1   11.40 )-----------( 223      ( 22 Jun 2021 06:56 )             48.1     06-22    143  |  105  |  22  ----------------------------<  93  3.5   |  29  |  0.80    Ca    8.8      22 Jun 2021 06:56  Phos  1.9     06-22  Mg     2.5     06-22    TPro  7.3  /  Alb  3.1<L>  /  TBili  0.7  /  DBili  x   /  AST  121<H>  /  ALT  111<H>  /  AlkPhos  73  06-22    06-19 @ 09:01  pH: 7.41  pCO2: 53  pO2: 97  SaO2: 98  06-17 @ 08:25  pH: 7.35  pCO2: 56  pO2: 77  SaO2: 96  06-17 @ 03:21  pH: 7.27  pCO2: 55  pO2: 76  SaO2: 95  06-17 @ 01:08  pH: 7.22  pCO2: 60  pO2: 81  SaO2: 95  06-16 @ 22:10  pH: 7.21  pCO2: 62  pO2: 204  SaO2: 99  06-16 @ 18:21  pH: 7.31  pCO2: 45  pO2: 205  SaO2: 99    ASSESSMENT AND PLAN:  ·	S/P Respiratory failure  ·	Moderate persistent Asthma with acute exacerbation.  ·	Rhino virus tracheobronchitis.  ·	Possible Pneumonia.  ·	Marijuana abuse.  ·	Obesity.    Gradual steroid taper.  Continue Symbicort and nebulizer.  Completed antibiotics.  Incentive spirometry.  Weight reduction.  DVT prophylaxis.

## 2021-06-22 NOTE — DISCHARGE NOTE PROVIDER - HOSPITAL COURSE
20 year old man with a history of asthma. Admitted on 6/16/21 with asthma exacerbation due to entero/rhino virus and complicated by status asthmaticus and respiratory failure requiring intubation. Found to have small Left PTX and b/l upper lobe opacities, which have improved. Patient was extubated on 6/20/21. Patient has strong cough. CTA done yesterday for episode of desaturation and chest discomfort. CT scan reported no PE. Bibasilar subsegmental atelectasis seen. Patient is alert and oriented, currently not in pain. Remains HD stable. Respiratory status improved but with has some mild wheezing. Continued with solmedrol 40 mg q12 and duonebs q4. Dr. Knott from Pulmonary was consulted for further recommendations. Patient was on CAP coverage with ceftriaxone and azithromycin. Abx will end 6/23/2021. Sputum cx and urine legionella negative. On incentive spirometer.  Acute hypoxic respiratory failure with acute asthma exacerbation ( caused by rhino/entero virus infection). improving. s/p extubation on 6/20/21.    20 year old man with a history of asthma. Admitted on 6/16/21 with asthma exacerbation due to entero/rhino virus and complicated by status asthmaticus and respiratory failure requiring intubation. Found to have small Left PTX and b/l upper lobe opacities, which have improved. Patient was extubated on 6/20/21. Patient has strong cough. CTA done yesterday for episode of desaturation and chest discomfort. CT scan reported no PE. Bibasilar subsegmental atelectasis seen. Patient is alert and oriented, currently not in pain. Remains HD stable. Respiratory status improved but with has some mild wheezing. Continued with solmedrol 40 mg q12 and duonebs q4. Dr. Knott from Pulmonary was consulted for further recommendations. Patient was on CAP coverage with ceftriaxone and azithromycin. Abx will end 6/23/2021. Sputum cx and urine legionella negative. On incentive spirometer.    Hospital course:   Acute hypoxic respiratory failure with acute asthma exacerbation ( caused by rhino/entero virus infection). improving. s/p extubation on 6/20/21. improved. discharge home on oral prednisone. Recommended to have follow up with PCP. Medication compliance is recommended also.   cont constipation ppx.   Drug abuse. counseling provided  Obesity class 2. nutrition counseling and exercise.    Seen and examined by me today. Vitals stable.   I have discussed all the inpatient radiographic findings with the patient and stressed that patient follows with the PCP for further outpatient care. I have printed inpatient lab results and report of imaging studies and given these to the patient to help with further outpatient care with PCP.   All questions welcomed and answered appropriately. Patient verbalized understanding of post discharge physician's follows up and discharge instructions.   DC time spent by me excluding billable procedures 38 mins

## 2021-06-22 NOTE — DISCHARGE NOTE PROVIDER - NSDCMRMEDTOKEN_GEN_ALL_CORE_FT
albuterol 90 mcg/inh inhalation powder: 6 puff(s) inhaled every 4 hours X 24 hrs and then space 4 puffs as needed via MDI spacer.   albuterol 90 mcg/inh inhalation powder: 6 puff(s) inhaled every 4 hours X 24 hrs and then space 4 puffs as needed via MDI spacer.  budesonide-formoterol 160 mcg-4.5 mcg/inh inhalation aerosol: 2 puff(s) inhaled 2 times a day   predniSONE 10 mg oral tablet: 5 tab(s) orally once a day x 2 days and then taper by 1 tab every other day.   tiotropium 18 mcg inhalation capsule: 1 cap(s) inhaled once a day

## 2021-06-22 NOTE — PROGRESS NOTE ADULT - SUBJECTIVE AND OBJECTIVE BOX
CHIEF COMPLAINT: SOB requiring supplemental oxygen at rest  no chest pain  no fever  + cough with clear sputum  no abd pain  no n/v/d/leg edema or calf pain.         PHYSICAL EXAM:    GENERAL: Moderately built, On NC 3 liter oxygen   CHEST/LUNG: Minimal wheezing bilaterally, no wheezing, no crackles   HEART: Regular rate and rhythm; No murmurs, rubs  ABDOMEN: Soft, Nontender, Nondistended; Bowel sounds present  EXTREMITIES:  Moving all four extremities spontaneously, No clubbing, cyanosis, or edema  NERVOUS SYSTEM:  Grossly non focal.  Psychiatry: AAO x 3, mood is appropriate       OBJECTIVE DATA:   Vital Signs Last 24 Hrs  T(C): 36.6 (2021 10:47), Max: 36.7 (2021 16:57)  T(F): 97.8 (2021 10:47), Max: 98.1 (2021 16:57)  HR: 107 (2021 10:47) (81 - 116)  BP: 137/96 (2021 10:47) (126/85 - 141/99)  BP(mean): --  RR: 18 (2021 10:47) (18 - 18)  SpO2: 97% (2021 10:47) (93% - 100%)           Daily     Daily Weight in k.6 (2021 05:30)  LABS:                        15.1   11.40 )-----------( 223      ( 2021 06:56 )             48.1             06-22    143  |  105  |  22  ----------------------------<  93  3.5   |  29  |  0.80    Ca    8.8      2021 06:56  Phos  1.9       Mg     2.5         TPro  7.3  /  Alb  3.1<L>  /  TBili  0.7  /  DBili  x   /  AST  121<H>  /  ALT  111<H>  /  AlkPhos  73                      CARDIAC MARKERS ( 2021 03:37 )  <.015 ng/mL / x     / x     / x     / x          Culture - Blood  Source: .Blood Blood  Preliminary Report ():    No growth to date.    Culture - Blood  Source: .Blood Blood  Preliminary Report ():    No growth to date.    Culture - Sputum  Source: .Sputum Sputum  Gram Stain ():    Moderate polymorphonuclear leukocytes per low power field    Rare Squamous epithelial cells per low power field    Rare Gram positive cocci in pairs per oil power field    Rare Gram Negative Rods per oil power field  Final Report ():    No growth at 48 hours    Culture - Urine  Source: .Urine Catheterized  Final Report ():    No growth         Interval Radiology studies: reviewed by me    < from: CT Angio Chest PE Protocol w/ IV Cont (21 @ 01:47) >  Suboptimal opacification of the pulmonary arteries. No central pulmonary embolus identified. No evidence of acute aortic syndrome.    Bibasilar subsegmental atelectasis.    < end of copied text >      MEDICATIONS  (STANDING):  ALBUTerol    90 MICROgram(s) HFA Inhaler 1 Puff(s) Inhalation every 4 hours  albuterol/ipratropium for Nebulization 3 milliLiter(s) Nebulizer every 6 hours  budesonide 160 MICROgram(s)/formoterol 4.5 MICROgram(s) Inhaler 2 Puff(s) Inhalation two times a day  chlorhexidine 2% Cloths 1 Application(s) Topical <User Schedule>  enoxaparin Injectable 40 milliGRAM(s) SubCutaneous two times a day  glucagon  Injectable 1 milliGRAM(s) IntraMuscular once  methylPREDNISolone sodium succinate Injectable 40 milliGRAM(s) IV Push every 12 hours  polyethylene glycol 3350 17 Gram(s) Oral daily  senna 2 Tablet(s) Oral at bedtime  tiotropium 18 MICROgram(s) Capsule 1 Capsule(s) Inhalation daily    MEDICATIONS  (PRN):  ALBUTerol    0.083% 2.5 milliGRAM(s) Nebulizer every 2 hours PRN Bronchospasm  benzocaine 15 mG/menthol 3.6 mG (Sugar-Free) Lozenge 1 Lozenge Oral every 4 hours PRN Sore Throat  guaiFENesin Oral Liquid (Sugar-Free) 100 milliGRAM(s) Oral every 6 hours PRN Cough  ibuprofen  Tablet. 600 milliGRAM(s) Oral four times a day PRN Severe Pain (7 - 10)

## 2021-06-22 NOTE — DISCHARGE NOTE PROVIDER - REASON FOR ADMISSION
Acute hypoxic respiratory failure with acute asthma exacerbation   caused by rhino/entero virus infection

## 2021-06-22 NOTE — DISCHARGE NOTE PROVIDER - NSDCCPCAREPLAN_GEN_ALL_CORE_FT
PRINCIPAL DISCHARGE DIAGNOSIS  Diagnosis: Severe persistent asthma with status asthmaticus  Assessment and Plan of Treatment:       SECONDARY DISCHARGE DIAGNOSES  Diagnosis: Respiratory failure  Assessment and Plan of Treatment:

## 2021-06-23 VITALS
OXYGEN SATURATION: 95 % | TEMPERATURE: 98 F | RESPIRATION RATE: 20 BRPM | SYSTOLIC BLOOD PRESSURE: 114 MMHG | DIASTOLIC BLOOD PRESSURE: 64 MMHG | HEART RATE: 94 BPM

## 2021-06-23 PROCEDURE — 99239 HOSP IP/OBS DSCHRG MGMT >30: CPT

## 2021-06-23 RX ORDER — TIOTROPIUM BROMIDE 18 UG/1
1 CAPSULE ORAL; RESPIRATORY (INHALATION)
Qty: 30 | Refills: 0
Start: 2021-06-23

## 2021-06-23 RX ORDER — BUDESONIDE AND FORMOTEROL FUMARATE DIHYDRATE 160; 4.5 UG/1; UG/1
2 AEROSOL RESPIRATORY (INHALATION)
Qty: 1 | Refills: 0
Start: 2021-06-23

## 2021-06-23 RX ADMIN — Medication 40 MILLIGRAM(S): at 05:35

## 2021-06-23 RX ADMIN — ENOXAPARIN SODIUM 40 MILLIGRAM(S): 100 INJECTION SUBCUTANEOUS at 05:35

## 2021-06-23 RX ADMIN — CHLORHEXIDINE GLUCONATE 1 APPLICATION(S): 213 SOLUTION TOPICAL at 05:43

## 2021-06-23 RX ADMIN — Medication 500 MICROGRAM(S): at 05:08

## 2021-06-23 RX ADMIN — Medication 500 MICROGRAM(S): at 11:04

## 2021-06-23 RX ADMIN — BUDESONIDE AND FORMOTEROL FUMARATE DIHYDRATE 2 PUFF(S): 160; 4.5 AEROSOL RESPIRATORY (INHALATION) at 05:37

## 2021-06-23 NOTE — DISCHARGE NOTE NURSING/CASE MANAGEMENT/SOCIAL WORK - PATIENT PORTAL LINK FT
You can access the FollowMyHealth Patient Portal offered by [facility] by registering at the following website: [email or web address]. By joining [facility] FollowMyHealth portal, you will also be able to view your health information using other applications (apps) compatible with our system. denies

## 2021-06-23 NOTE — DISCHARGE NOTE NURSING/CASE MANAGEMENT/SOCIAL WORK - NSDCVIVACCINE_GEN_ALL_CORE_FT
Tdap; 11-May-2017 20:07; Stephen Borja (MIGEL); [employer]; [id number]; IntraMuscular; Deltoid Right.; 0.5 milliLiter(s); VIS (VIS Published: 09-May-2013, VIS Presented: 11-May-2017);

## 2021-06-24 LAB
CULTURE RESULTS: SIGNIFICANT CHANGE UP
CULTURE RESULTS: SIGNIFICANT CHANGE UP
SPECIMEN SOURCE: SIGNIFICANT CHANGE UP
SPECIMEN SOURCE: SIGNIFICANT CHANGE UP

## 2021-06-28 DIAGNOSIS — J18.9 PNEUMONIA, UNSPECIFIED ORGANISM: ICD-10-CM

## 2021-06-28 DIAGNOSIS — J40 BRONCHITIS, NOT SPECIFIED AS ACUTE OR CHRONIC: ICD-10-CM

## 2021-06-28 DIAGNOSIS — J93.9 PNEUMOTHORAX, UNSPECIFIED: ICD-10-CM

## 2021-06-28 DIAGNOSIS — B97.89 OTHER VIRAL AGENTS AS THE CAUSE OF DISEASES CLASSIFIED ELSEWHERE: ICD-10-CM

## 2021-06-28 DIAGNOSIS — E66.9 OBESITY, UNSPECIFIED: ICD-10-CM

## 2021-06-28 DIAGNOSIS — J45.51 SEVERE PERSISTENT ASTHMA WITH (ACUTE) EXACERBATION: ICD-10-CM

## 2021-06-28 DIAGNOSIS — F41.9 ANXIETY DISORDER, UNSPECIFIED: ICD-10-CM

## 2021-06-28 DIAGNOSIS — E87.8 OTHER DISORDERS OF ELECTROLYTE AND FLUID BALANCE, NOT ELSEWHERE CLASSIFIED: ICD-10-CM

## 2021-06-28 DIAGNOSIS — J45.52 SEVERE PERSISTENT ASTHMA WITH STATUS ASTHMATICUS: ICD-10-CM

## 2021-06-28 DIAGNOSIS — Z78.1 PHYSICAL RESTRAINT STATUS: ICD-10-CM

## 2021-06-28 DIAGNOSIS — E87.5 HYPERKALEMIA: ICD-10-CM

## 2021-06-28 DIAGNOSIS — J96.01 ACUTE RESPIRATORY FAILURE WITH HYPOXIA: ICD-10-CM

## 2021-06-28 DIAGNOSIS — F12.10 CANNABIS ABUSE, UNCOMPLICATED: ICD-10-CM
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
## 2021-06-28 DIAGNOSIS — T48.6X5A ADVERSE EFFECT OF ANTIASTHMATICS, INITIAL ENCOUNTER: ICD-10-CM

## 2021-06-28 DIAGNOSIS — R00.0 TACHYCARDIA, UNSPECIFIED: ICD-10-CM

## 2021-06-28 DIAGNOSIS — J98.11 ATELECTASIS: ICD-10-CM

## 2021-06-28 DIAGNOSIS — B97.10 UNSPECIFIED ENTEROVIRUS AS THE CAUSE OF DISEASES CLASSIFIED ELSEWHERE: ICD-10-CM

## 2021-11-23 NOTE — ED PROCEDURE NOTE - EBL
Quality 402: Tobacco Use And Help With Quitting Among Adolescents: Patient screened for tobacco and never smoked
Quality 110: Preventive Care And Screening: Influenza Immunization: Influenza Immunization not Administered because Patient Refused.
Quality 130: Documentation Of Current Medications In The Medical Record: Current Medications Documented
Detail Level: Detailed
minimal

## 2022-02-16 NOTE — CONSULT NOTE ADULT - CONSULT REQUESTED BY NAME
(Inserted Image. Unable to display)   August 15, 2019      YASSINE ARELLANO      [address] [place] [phone]        Dear YASSINE,    Thank you for selecting [facility] for your healthcare needs.  Below you will find the results of the recent tests done at our clinic.      Your lab work is unremarkable, Yassine.  I will wait for the pelvic ultrasound report.      Result Name Current Result Reference Range   Amylase Level (unit/L)  52 8/14/2019 21 - 101   WBC  5.7 8/14/2019 4.5 - 13.0   RBC  4.50 8/14/2019 3.80 - 5.10   Hgb (gm/dL)  12.8 8/14/2019 11.5 - 15.3   Hct (%)  39.1 8/14/2019 34.0 - 46.0   MCV (fL)  86.9 8/14/2019 78.0 - 98.0   MCH (pg)  28.4 8/14/2019 25.0 - 35.0   MCHC (gm/dL)  32.7 8/14/2019 31.0 - 36.0   RDW (%)  12.6 8/14/2019 11.0 - 15.0   Platelet  368 8/14/2019 140 - 400   MPV (fL)  8.6 8/14/2019 7.5 - 12.5   Lymphocytes (%)  53.1 8/14/2019    Abs Lymphocytes  3,027 8/14/2019 1,200 - 5,200   Neutrophils (%)  30 8/14/2019    Abs Neutrophils ((L)) 1,710 8/14/2019 1,800 - 8,000   Monocytes (%)  13.2 8/14/2019    Abs Monocytes  752 8/14/2019 200 - 900   Eosinophils (%)  2.5 8/14/2019    Abs Eosinophils  143 8/14/2019 15 - 500   Basophils (%)  1.2 8/14/2019    Abs Basophils  68 8/14/2019 0 - 200   Lyme Ab IgG/IgM WB  <0.90 8/14/2019        Please contact me or my assistant at [phone] if you have any questions or concerns.     Sincerely,        ALEXANDER Kevin-NP  Family Nurse Practitioner      What do your labs mean?  Below is a glossary of commonly ordered labs:  LDL   Bad Cholesterol   HDL   Good Cholesterol  AST/ALT   Liver Function   Cr/Creatinine   Kidney Function  Microalbumin   Kidney Function  BUN   Kidney Function  PSA   Prostate    TSH   Thyroid Hormone  HgbA1c   Diabetes Test   Hgb (Hemoglobin)   Red Blood Cells  WBC   White Blood Cell Count   DR. Shannon

## 2022-07-21 NOTE — BH CONSULTATION LIAISON ASSESSMENT NOTE - SUBSTANCE USE
Problem: Discharge Planning  Goal: Discharge to home or other facility with appropriate resources  Outcome: Progressing     Problem: Pain  Goal: Verbalizes/displays adequate comfort level or baseline comfort level  Outcome: Progressing
None known

## 2023-09-27 ENCOUNTER — EMERGENCY (EMERGENCY)
Facility: HOSPITAL | Age: 23
LOS: 0 days | Discharge: HOME | End: 2023-09-27
Payer: MEDICAID

## 2023-09-27 VITALS
DIASTOLIC BLOOD PRESSURE: 87 MMHG | HEIGHT: 66 IN | TEMPERATURE: 99 F | OXYGEN SATURATION: 96 % | HEART RATE: 119 BPM | WEIGHT: 240.08 LBS | SYSTOLIC BLOOD PRESSURE: 128 MMHG | RESPIRATION RATE: 24 BRPM

## 2023-09-27 VITALS
RESPIRATION RATE: 14 BRPM | OXYGEN SATURATION: 95 % | HEART RATE: 116 BPM | DIASTOLIC BLOOD PRESSURE: 67 MMHG | TEMPERATURE: 98 F | SYSTOLIC BLOOD PRESSURE: 129 MMHG

## 2023-09-27 DIAGNOSIS — Z20.822 CONTACT WITH AND (SUSPECTED) EXPOSURE TO COVID-19: ICD-10-CM

## 2023-09-27 DIAGNOSIS — R06.2 WHEEZING: ICD-10-CM

## 2023-09-27 DIAGNOSIS — R07.89 OTHER CHEST PAIN: ICD-10-CM

## 2023-09-27 DIAGNOSIS — J02.9 ACUTE PHARYNGITIS, UNSPECIFIED: ICD-10-CM

## 2023-09-27 DIAGNOSIS — J45.901 UNSPECIFIED ASTHMA WITH (ACUTE) EXACERBATION: ICD-10-CM

## 2023-09-27 LAB
ALBUMIN SERPL ELPH-MCNC: 3.6 G/DL — SIGNIFICANT CHANGE UP (ref 3.3–5)
ALP SERPL-CCNC: 93 U/L — SIGNIFICANT CHANGE UP (ref 40–120)
ALT FLD-CCNC: 41 U/L — SIGNIFICANT CHANGE UP (ref 12–78)
ANION GAP SERPL CALC-SCNC: 5 MMOL/L — SIGNIFICANT CHANGE UP (ref 5–17)
APTT BLD: 40 SEC — HIGH (ref 24.5–35.6)
AST SERPL-CCNC: 24 U/L — SIGNIFICANT CHANGE UP (ref 15–37)
BASE EXCESS BLDV CALC-SCNC: 2.3 MMOL/L — SIGNIFICANT CHANGE UP (ref -2–3)
BASOPHILS # BLD AUTO: 0.02 K/UL — SIGNIFICANT CHANGE UP (ref 0–0.2)
BASOPHILS NFR BLD AUTO: 0.2 % — SIGNIFICANT CHANGE UP (ref 0–2)
BILIRUB SERPL-MCNC: 0.5 MG/DL — SIGNIFICANT CHANGE UP (ref 0.2–1.2)
BLOOD GAS COMMENTS, VENOUS: SIGNIFICANT CHANGE UP
BUN SERPL-MCNC: 11 MG/DL — SIGNIFICANT CHANGE UP (ref 7–23)
CALCIUM SERPL-MCNC: 8.8 MG/DL — SIGNIFICANT CHANGE UP (ref 8.5–10.1)
CHLORIDE BLDV-SCNC: 104 MMOL/L — SIGNIFICANT CHANGE UP (ref 98–107)
CHLORIDE SERPL-SCNC: 108 MMOL/L — SIGNIFICANT CHANGE UP (ref 96–108)
CO2 BLDV-SCNC: 30 MMOL/L — HIGH (ref 22–26)
CO2 SERPL-SCNC: 26 MMOL/L — SIGNIFICANT CHANGE UP (ref 22–31)
CREAT SERPL-MCNC: 1.01 MG/DL — SIGNIFICANT CHANGE UP (ref 0.5–1.3)
EGFR: 106 ML/MIN/1.73M2 — SIGNIFICANT CHANGE UP
EOSINOPHIL # BLD AUTO: 0.15 K/UL — SIGNIFICANT CHANGE UP (ref 0–0.5)
EOSINOPHIL NFR BLD AUTO: 1.4 % — SIGNIFICANT CHANGE UP (ref 0–6)
FLUAV AG NPH QL: SIGNIFICANT CHANGE UP
FLUBV AG NPH QL: SIGNIFICANT CHANGE UP
GAS PNL BLDV: 136 MMOL/L — SIGNIFICANT CHANGE UP (ref 136–145)
GAS PNL BLDV: SIGNIFICANT CHANGE UP
GAS PNL BLDV: SIGNIFICANT CHANGE UP
GLUCOSE BLDV-MCNC: 93 MG/DL — SIGNIFICANT CHANGE UP (ref 65–95)
GLUCOSE SERPL-MCNC: 90 MG/DL — SIGNIFICANT CHANGE UP (ref 70–99)
HCO3 BLDV-SCNC: 28 MMOL/L — SIGNIFICANT CHANGE UP (ref 22–28)
HCT VFR BLD CALC: 48 % — SIGNIFICANT CHANGE UP (ref 39–50)
HCT VFR BLDA CALC: 47 % — SIGNIFICANT CHANGE UP (ref 37–47)
HGB BLD CALC-MCNC: 15.7 G/DL — SIGNIFICANT CHANGE UP (ref 12.6–17.4)
HGB BLD-MCNC: 15.4 G/DL — SIGNIFICANT CHANGE UP (ref 13–17)
IMM GRANULOCYTES NFR BLD AUTO: 0.4 % — SIGNIFICANT CHANGE UP (ref 0–0.9)
INR BLD: 0.95 RATIO — SIGNIFICANT CHANGE UP (ref 0.85–1.18)
LACTATE BLDV-MCNC: 1.1 MMOL/L — SIGNIFICANT CHANGE UP (ref 0.56–1.39)
LYMPHOCYTES # BLD AUTO: 1.98 K/UL — SIGNIFICANT CHANGE UP (ref 1–3.3)
LYMPHOCYTES # BLD AUTO: 18.6 % — SIGNIFICANT CHANGE UP (ref 13–44)
MCHC RBC-ENTMCNC: 26.7 PG — LOW (ref 27–34)
MCHC RBC-ENTMCNC: 32.1 G/DL — SIGNIFICANT CHANGE UP (ref 32–36)
MCV RBC AUTO: 83.3 FL — SIGNIFICANT CHANGE UP (ref 80–100)
MONOCYTES # BLD AUTO: 0.9 K/UL — SIGNIFICANT CHANGE UP (ref 0–0.9)
MONOCYTES NFR BLD AUTO: 8.5 % — SIGNIFICANT CHANGE UP (ref 2–14)
NEUTROPHILS # BLD AUTO: 7.55 K/UL — HIGH (ref 1.8–7.4)
NEUTROPHILS NFR BLD AUTO: 70.9 % — SIGNIFICANT CHANGE UP (ref 43–77)
NRBC # BLD: 0 /100 WBCS — SIGNIFICANT CHANGE UP (ref 0–0)
PCO2 BLDV: 48 MMHG — SIGNIFICANT CHANGE UP (ref 42–55)
PH BLDV: 7.38 — SIGNIFICANT CHANGE UP (ref 7.32–7.43)
PLATELET # BLD AUTO: 263 K/UL — SIGNIFICANT CHANGE UP (ref 150–400)
PO2 BLDV: 54 MMHG — HIGH (ref 25–45)
POTASSIUM BLDV-SCNC: 4.1 MMOL/L — SIGNIFICANT CHANGE UP (ref 3.5–5.1)
POTASSIUM SERPL-MCNC: 4.3 MMOL/L — SIGNIFICANT CHANGE UP (ref 3.5–5.3)
POTASSIUM SERPL-SCNC: 4.3 MMOL/L — SIGNIFICANT CHANGE UP (ref 3.5–5.3)
PROT SERPL-MCNC: 7.9 GM/DL — SIGNIFICANT CHANGE UP (ref 6–8.3)
PROTHROM AB SERPL-ACNC: 11.4 SEC — SIGNIFICANT CHANGE UP (ref 9.5–13)
RBC # BLD: 5.76 M/UL — SIGNIFICANT CHANGE UP (ref 4.2–5.8)
RBC # FLD: 13.5 % — SIGNIFICANT CHANGE UP (ref 10.3–14.5)
SAO2 % BLDV: 89.5 % — LOW (ref 94–98)
SARS-COV-2 RNA SPEC QL NAA+PROBE: SIGNIFICANT CHANGE UP
SODIUM SERPL-SCNC: 139 MMOL/L — SIGNIFICANT CHANGE UP (ref 135–145)
WBC # BLD: 10.64 K/UL — HIGH (ref 3.8–10.5)
WBC # FLD AUTO: 10.64 K/UL — HIGH (ref 3.8–10.5)

## 2023-09-27 PROCEDURE — 93010 ELECTROCARDIOGRAM REPORT: CPT

## 2023-09-27 PROCEDURE — 99285 EMERGENCY DEPT VISIT HI MDM: CPT

## 2023-09-27 PROCEDURE — 71045 X-RAY EXAM CHEST 1 VIEW: CPT | Mod: 26

## 2023-09-27 RX ORDER — IPRATROPIUM/ALBUTEROL SULFATE 18-103MCG
3 AEROSOL WITH ADAPTER (GRAM) INHALATION
Refills: 0 | Status: COMPLETED | OUTPATIENT
Start: 2023-09-27 | End: 2023-09-27

## 2023-09-27 RX ORDER — SODIUM CHLORIDE 9 MG/ML
1000 INJECTION INTRAMUSCULAR; INTRAVENOUS; SUBCUTANEOUS ONCE
Refills: 0 | Status: COMPLETED | OUTPATIENT
Start: 2023-09-27 | End: 2023-09-27

## 2023-09-27 RX ORDER — MAGNESIUM SULFATE 500 MG/ML
2 VIAL (ML) INJECTION ONCE
Refills: 0 | Status: COMPLETED | OUTPATIENT
Start: 2023-09-27 | End: 2023-09-27

## 2023-09-27 RX ADMIN — Medication 150 GRAM(S): at 13:53

## 2023-09-27 RX ADMIN — SODIUM CHLORIDE 1000 MILLILITER(S): 9 INJECTION INTRAMUSCULAR; INTRAVENOUS; SUBCUTANEOUS at 16:22

## 2023-09-27 RX ADMIN — Medication 2 GRAM(S): at 14:00

## 2023-09-27 RX ADMIN — Medication 3 MILLILITER(S): at 14:18

## 2023-09-27 RX ADMIN — SODIUM CHLORIDE 1000 MILLILITER(S): 9 INJECTION INTRAMUSCULAR; INTRAVENOUS; SUBCUTANEOUS at 17:36

## 2023-09-27 RX ADMIN — Medication 125 MILLIGRAM(S): at 13:53

## 2023-09-27 RX ADMIN — Medication 3 MILLILITER(S): at 14:35

## 2023-09-27 RX ADMIN — Medication 3 MILLILITER(S): at 13:52

## 2023-09-27 NOTE — SBIRT NOTE ADULT - NSSBIRTDRGBRIEFINTDET_GEN_A_CORE
Provided SBIRT services: Full screen positive. Pt reports marijuana use 2x/week. Brief Intervention Performed. Screening results were reviewed with the patient and patient was provided information about healthy guidelines and potential negative consequences associated with level of risk. Motivation and readiness to reduce or stop use was discussed and goals and activities to make changes were suggested/offered. Pt reports last use Saturday.

## 2023-09-27 NOTE — ED PROVIDER NOTE - OBJECTIVE STATEMENT
23y Male with past medical history of asthma (intubated in the past, last 3 years ago) on albuterol prn presents to the ER for asthma exacerbation. Reports viral like symptoms that began 2 days ago cough, sore throat, tight chest, states those symptoms improved, started with wheezing and worsening chest tightness yesterday despite albuterol so came to ER for evaluation. Denies fever, chills, SOB, abdominal pain, n/v/d or urinary complaints.

## 2023-09-27 NOTE — ED ADULT NURSE NOTE - CAS TRG GEN SKIN CONDITION
Detail Level: Zone Continue Regimen: Alevicyn dermal 0.009% spray and TAC 0.1% ointment Plan: Intralesional Kenalog 5mg/cc injections today -    4cc total injection today;  Patient encouraged to refrain from picking/ scratching at skin and keep areas covered. Plan: Start TAC 0.1% ointment bid to eczema Warm

## 2023-09-27 NOTE — ED PROVIDER NOTE - PATIENT PORTAL LINK FT
You can access the FollowMyHealth Patient Portal offered by [facility] by registering at the following website: [email or web address]. By joining [facility]’s FollowMyHealth portal, you will also be able to view your health information using other applications (apps) compatible with our system.

## 2023-09-27 NOTE — ED ADULT NURSE NOTE - OBJECTIVE STATEMENT
Pt AOx4, responsive, ambulatory. Pt c/o mid sternal chest tightness, SOB/difficulty breathing, wet productive cough with yellow phelgm, rhinorrhea x 2 days. Pt states he has been using his albuterol inhaler q1hr without relief. Denies fever/chills, n/v/d, headache/dizziness. NKA. PMH asthma. ecg completed. placed on cardiac monitor, tolerating RA; on continuos pulse oximetry. Pt not in respiratory distress; able to speak full sentence.

## 2023-09-27 NOTE — ED ADULT NURSE NOTE - NSFALLRISKASMT_ED_ALL_ED_DT
05/30/17 1000   PsychosocialAddendum to Intake   Intake Assessment has been Reviewed Yes   Primary Social Support System Friends   Family / Social Assessment   Does Patient Have Family or Social Issues No   Describe Patient's Childhood not obtained   Describe Present Family / Significant Other Relationships lives with friends; close to his mother   Describe Patient's Relationships with [place] and/or Social Environment has good close friends   Describe Patient's Leisure Activities, Hobbies/Interests likes to spend time with friends and family   Source of Information for Psychosocial Assessment Patient;Intake Assessment   Abuse Evaluation   Current Abuse Toward You No   Current Abuse by You Toward Others No   History of Adult Abuse No   History of Childhood Abuse No   Psychological Trauma No   Sexual Preference / Identity   Sexual Issues or Concerns No   Relationship Status Single   Comments no SO   Do You Have Children? Yes   Comments 5 children with different mothers   Current Living Conditions   Problems with Living Situation No  (stays with friends on weekend; Travels during week for job)   Family/Social Issues   Family / Social Issues No   Any History of Family AODA No   Any History of Family Mental Illness Yes   Comments for Mental Illness History both parents bipolar disorder; cousin suicide at age 19   Any Problems with Peers / Social Environment No   Is Spirituality Part of Patients Life No   Yarsani Affiliation No   Childhood or Adolescent Problems No   Community Support   Support Groups None   CSP / TCM / Other No   Additional Services No   Employment / School   Are You Attending School? No   Are You Employed? Yes   Any Employment / School/Vocational Issues? No   Provider Information   Does Patient Have Outpatient Psychotherapist No   Does Patient Have Outpatient Psychiatrist? No   Primary Care Physician None    None   Any Other Providers Past and/or Present None   Clinical  Interpretation   Clinical Summary Pt is a 29 yr old SWM voluntarily admitted for anxiety with SI and increased paranoia and hallucinations. Pt reports hx of schizoaffective disorder and has not taken his medication since returning to [place] after living in [place]. Pt states he has been stable in the past on medication and wants to resume taking it regularly. He agrees to outpatient providers. He states he has a good family and friend support system.   Impact of Presenting Problems on Life Situations: inability to function in daily life   Patient's Strengths motivated; intelligent   Patient's Limitations: lack of coping skills   Patient's Motivation for Treatment contemplative   Treatment Recommendations   Recommended Steps for Discharge to Occur absence of SI; mood stabilization   High Risk none identified   Family/Collateral Involvement in Treatment declines   Community Resource/Support Systems in DC Planning [facility] CM to arrange aftercare   Program Psychothearapist Role in Treatment & DC Planning 1:1; Psychosocial assessment; Offer resources and support; Encourage participation in groups   Family Session Offered Yes   Did Patient Agree to Family Session? Pt declined      27-Sep-2023 12:41

## 2023-09-27 NOTE — ED ADULT TRIAGE NOTE - CHIEF COMPLAINT QUOTE
as per patient " coming down with a cold, tightness in my chest and my inhaler is not helping x 2 days." [ hx of Asthma. Noted no respiratory distress in triage]

## 2023-09-27 NOTE — ED ADULT NURSE NOTE - NSFALLUNIVINTERV_ED_ALL_ED
Bed/Stretcher in lowest position, wheels locked, appropriate side rails in place/Call bell, personal items and telephone in reach/Instruct patient to call for assistance before getting out of bed/chair/stretcher/Non-slip footwear applied when patient is off stretcher/[place] to call system/Physically safe environment - no spills, clutter or unnecessary equipment/Purposeful proactive rounding/Room/bathroom lighting operational, light cord in reach

## 2023-09-27 NOTE — ED PROVIDER NOTE - NSFOLLOWUPINSTRUCTIONS_ED_ALL_ED_FT
Today you were seen in the ER for asthma exacerbation     A prescription for Prednisone was sent to the pharmacy. Start tomorrow and take for 4 days.     Asthma    Asthma is a condition in which the airways tighten and narrow, making it difficult to breath. Asthma episodes, also called asthma attacks, range from minor to life-threatening. Symptoms include wheezing, coughing, chest tightness, or shortness of breath. The diagnosis of asthma is made by a review of your medical history and a physical exam, but may involve additional testing. Asthma cannot be cured, but medicines and lifestyle changes can help control it. Avoid triggers of asthma which may include animal dander, pollen, mold, smoke, air pollutants, etc.     SEEK IMMEDIATE MEDICAL CARE IF YOU HAVE ANY OF THE FOLLOWING SYMPTOMS: worsening of symptoms, shortness of breath at rest, chest pain, bluish discoloration to lips or fingertips, lightheadedness/dizziness, or fever.    Advance activity as tolerated.      Continue all previously prescribed medications as directed unless otherwise instructed.      Follow up with your primary care physician in 48-72 hours- bring copies of your results.

## 2023-10-17 NOTE — ED PROCEDURE NOTE - CPROC ED INFORMED CONSENT1
Benefits, risks, and possible complications of procedure explained to patient/caregiver who verbalized understanding and gave verbal consent. Hemostasis: Drysol

## 2023-11-17 NOTE — PROVIDER CONTACT NOTE (CRITICAL VALUE NOTIFICATION) - DATE AND TIME:
[facility] EMERGENCY DEPARTMENT ([facility])    11/16/23      ED PROVIDER NOTE      History     Chief Complaint   Patient presents with    Fall     Patient fell out of her wheelchair over a curb onto her knees and then fell out again onto her right side when trying to get back home.     Leg Injury     HPI  Marylee Woods is a 66 year old female with a past medical history significant for multiple sclerosis, stage III CKD, osteoporosis, and multiple orthopedic surgeries presents to the ED for evaluation of leg injury due to fall.  Patient had initially fallen out of her wheelchair onto her knees, when going over a curb earlier today.  When attempting to return home she had suffered another fall out of her wheelchair, in which she fell onto her right knee and felt shooting pain up her leg to her hip. She also endorses left ankle pain and left hip pain. She has restricted mobility at baseline which is now even worse in the setting of the pain after the fall. She denies hitting her head. She is not on blood thinner medications.     Past Medical History  Past Medical History:   Diagnosis Date    Atypical ductal hyperplasia of left breast 02/2022    Depression     Gastro-oesophageal reflux disease     Graves disease     Multiple sclerosis (H)     Osteoporosis     Postablative hypothyroidism      Past Surgical History:   Procedure Laterality Date    C/SECTION, LOW TRANSVERSE  1992    COLONOSCOPY  2007    COLONOSCOPY N/A 1/26/2017    Procedure: COMBINED COLONOSCOPY, SINGLE OR MULTIPLE BIOPSY/POLYPECTOMY BY BIOPSY;  Surgeon: Mayo Adkins MD, MD;  Location:  GI    ESOPHAGOSCOPY, GASTROSCOPY, DUODENOSCOPY (EGD), COMBINED  1/26/2017    Dr. Adkins [facility]    ESOPHAGOSCOPY, GASTROSCOPY, DUODENOSCOPY (EGD), COMBINED N/A 1/26/2017    Procedure: COMBINED ESOPHAGOSCOPY, GASTROSCOPY, DUODENOSCOPY (EGD), BIOPSY SINGLE OR MULTIPLE;  Surgeon: Mayo Adkins MD, MD;  Location:  GI    ESOPHAGOSCOPY, GASTROSCOPY, DUODENOSCOPY (EGD),  17-Jun-2021 00:55 COMBINED N/A 4/20/2023    Procedure: ESOPHAGOGASTRODUODENOSCOPY, WITH BIOPSY;  Surgeon: Cristina Zuniga MD;  Location: [facility] GI    HIP SURGERY  2009    femur ortho surgery    LAPAROSCOPIC CHOLECYSTECTOMY  6/24/2014    Procedure: LAPAROSCOPIC CHOLECYSTECTOMY;  Surgeon: Randy Bailey MD;  Location:  [facility]    LUMPECTOMY BREAST Left 3/31/2022    Procedure: LEFT Radiofrequency Identification Seed-Localized Lumpectomy;  Surgeon: Amanda Liu MD;  Location: [facility] OR    OPEN REDUCTION INTERNAL FIXATION FEMUR DISTAL Left 11/1/2018    Procedure: OPEN REDUCTION INTERNAL FIXATION LEFT FEMUR DISTAL;  Surgeon: Jose Valadez MD;  Location: [facility] OR    OPEN REDUCTION INTERNAL FIXATION RODDING INTRAMEDULLARY TIBIA  4/14/2013    Procedure: OPEN REDUCTION INTERNAL FIXATION RODDING INTRAMEDULLARY TIBIA;;  Surgeon: Sajan Cast MD;  Location: [facility] OR    ORTHOPEDIC SURGERY  2009    surgery right upper femur fx near hip     No current outpatient medications on file.    Allergies   Allergen Reactions    Amantadine      hallucinations    Budesonide     Budesonide-Formoterol Fumarate Rash     Family History  Family History   Problem Relation Age of Onset    Heart Disease Maternal Grandmother     Cancer Maternal Grandfather     Heart Disease Paternal Grandfather     Heart Disease Mother     Heart Disease Father     Diabetes No family hx of     Coronary Artery Disease No family hx of     Hypertension No family hx of     Hyperlipidemia No family hx of     Cerebrovascular Disease No family hx of     Breast Cancer No family hx of     Colon Cancer No family hx of     Prostate Cancer No family hx of     Other Cancer No family hx of     Depression No family hx of     Anxiety Disorder No family hx of     Mental Illness No family hx of     Substance Abuse No family hx of     Anesthesia Reaction No family hx of     Asthma No family hx of     Osteoporosis No family hx of     Genetic Disorder No family hx of      "Thyroid Disease No family hx of     Obesity No family hx of     Unknown/Adopted No family hx of      Social History   Social History     Tobacco Use    Smoking status: Every Day     Packs/day: .25     Types: Cigarettes     Last attempt to quit: 2022     Years since quittin.8    Smokeless tobacco: Never   Vaping Use    Vaping Use: Never used   Substance Use Topics    Alcohol use: Yes     Alcohol/week: 0.0 standard drinks of alcohol     Comment: occasional     Drug use: No      Past medical history, past surgical history, medications, allergies, family history, and social history were reviewed with the patient. No additional pertinent items.      A complete review of systems was performed with pertinent positives and negatives noted in the HPI, and all other systems negative.    Physical Exam   BP: (!) 84/49  Pulse: 84  Temp: 97.9  F (36.6  C)  Resp: 18  Height: 172.7 cm (5' 8\")  Weight: 63.5 kg (140 lb)  SpO2: 98 %  Physical Exam  Vitals reviewed.   Constitutional:       General: She is not in acute distress.     Appearance: Normal appearance. She is not toxic-appearing or diaphoretic.      Comments: Chronically ill appearing   HENT:      Head: Normocephalic and atraumatic.      Mouth/Throat:      Mouth: Mucous membranes are dry.      Pharynx: Oropharynx is clear.   Eyes:      Extraocular Movements: Extraocular movements intact.      Pupils: Pupils are equal, round, and reactive to light.   Cardiovascular:      Rate and Rhythm: Normal rate and regular rhythm.   Pulmonary:      Effort: Pulmonary effort is normal. No respiratory distress.   Abdominal:      Palpations: Abdomen is soft.      Tenderness: There is no abdominal tenderness.   Musculoskeletal:         General: Swelling, tenderness and signs of injury present.      Cervical back: Neck supple. No tenderness.      Comments: Swelling and pain to palpation of right knee   Skin:     General: Skin is warm and dry.   Neurological:      Mental Status: She is " alert and oriented to person, place, and time. Mental status is at baseline.      Comments: Patient has contracted flexed appearance of upper and lower extremities limiting strength and reflex assessment. Sensation intact to light touch in distal upper and lower extremities bilaterally.    Psychiatric:         Mood and Affect: Mood normal.         Behavior: Behavior normal.           ED Course, Procedures, & Data      Procedures                     Results for orders placed or performed during the hospital encounter of 11/16/23   XR Knee Left 3 Views     Status: None    Narrative    EXAM: XR KNEE LEFT 3 VIEWS  LOCATION: [facility]  DATE: 11/17/2023    INDICATION: Fall, pain and swelling. History of MS.  COMPARISON: X-ray left femur 2 views (4 films) 03/22/2019 at 1316 hours.      Impression    IMPRESSION: Mild tricompartmental degenerative arthritis left knee without acute fracture, dislocation, effusion or calcified loose bodies. Postoperative changes of plate and screw fixation of previously healed distal left femoral metaphyseal fracture,   interval healing of the fracture lines. Intramedullary nail with proximal locking screws in the visualized left tibia. Visualized hardware is well seated with good alignment. No acute fracture or dislocation. Healed fracture deformity of the proximal   metaphysis of the left fibula. No x-ray evidence of avascular necrosis. Demineralization of the visualized bones.   XR Knee Right 3 Views     Status: None    Narrative    EXAM: XR KNEE RIGHT 3 VIEWS  LOCATION: [facility]  DATE: 11/17/2023    INDICATION: Pain after fall.  COMPARISON: Pelvis and right hip radiographs 10/30/2017.      Impression    IMPRESSION: Bones are demineralized. Acute comminuted and impacted fracture of the distal right femoral metadiaphysis without definite joint involvement. No significant joint effusion. Post ORIF  of the proximal right femur. Hardware in satisfactory   position without complication detected.     XR Ankle Left G/E 3 Views     Status: None    Narrative    EXAM: XR ANKLE LEFT G/E 3 VIEWS  LOCATION: [facility]  DATE: 11/17/2023    INDICATION: fall, pain, swelling, history of MS  COMPARISON: None.      Impression    IMPRESSION: Probable nondisplaced fracture of the medial malleolus. No other acute fracture or dislocation. The ankle mortise is intact. Postoperative change in the distal tibia and fibula. The bones are demineralized.   XR Pelvis 1/2 Views     Status: None    Narrative    EXAM: XR PELVIS 1/2 VIEWS  LOCATION: [facility]  DATE: 11/17/2023    INDICATION: Fall. Swelling. History of MS.  COMPARISON: X-ray abdomen 2 views (3 films) 06/30/2021 at 1509 hours.      Impression    IMPRESSION: Demineralization of the visualized bones. Degenerative changes lower lumbar spine and joints of the pelvis. No acute fracture or dislocation. Dynamic hip screw and sideplate proximal right femur, partially visualized, unchanged. Prominent   amount of formed stool material within the visualized redundant colon.   XR Femur Right 2 Views     Status: None    Narrative    EXAM: XR FEMUR RIGHT 2 VIEWS  LOCATION: [facility]  DATE: 11/17/2023    INDICATION: Pain after fall.  COMPARISON: Pelvis and right hip radiographs 10/30/2017.      Impression    IMPRESSION: Bones are demineralized. Acute comminuted and impacted fracture of the distal right femoral metadiaphysis without definite joint involvement. No significant joint effusion. Post ORIF of the proximal right femur. Hardware in satisfactory   position without complication detected.   CT Knee Right w/o Contrast     Status: None    Narrative    CT KNEE RIGHT WITHOUT CONTRAST 11/17/2023 11:27 AM    CLINICAL HISTORY: distal femur fracture, rule  17-Jun-2021 05:56 out Hoffa's fragment.  Right knee pain.    TECHNIQUE: CT scan of the abdomen and pelvis was performed without IV  contrast. Multiplanar reformats were obtained. Dose reduction  techniques were used.  CONTRAST: None.    COMPARISON: 11/17/2023 radiographs.    FINDINGS:   Bones: There is an acute comminuted moderately displaced fracture of  the distal femoral metaphysis without significant angulation at the  fracture site. There are a few anteriorly displaced cortical fracture  fragments, as seen on series 2 image 23 and series 4 image 135.  Portions of this fracture extended intra-articularly at the level of  the trochlear groove as seen on series 2 image 40 and the anterior  portion of the intercondylar notch. Osteopenia. No evidence of  additional fractures.    Soft tissues: There is some more defined soft tissue stranding around  the femoral fracture site. There is moderate global atrophy of the  visualized knee and proximal calf musculature. Small knee joint  effusion. Evidence of intra-articular fracture fragments.      Impression    IMPRESSION:   1.  Acute comminuted mildly displaced fracture of the distal femoral  metaphysis with intra-articular extension.    JOCELYNN LYON MD         SYSTEM ID:  [id number]   XR Knee Port Right 1/2 Views     Status: None    Narrative    EXAM: XR KNEE PORT RIGHT 1/2 VIEWS  LOCATION: [facility]  DATE: 11/18/2023    INDICATION: Fracture follow-up.  COMPARISON: 11/17/2023.      Impression    IMPRESSION: Impacted, mildly comminuted fracture of the distal femoral metaphysis. No change in fracture position or alignment. No significant deformity/displacement of the femoral condyles are normal in the joint alignment. Soft tissue swelling in the   knee. Bones are demineralized. Cast in place.   XR Chest Port 1 View     Status: None    Narrative    Exam: XR CHEST PORT 1 VIEW, 11/18/2023 6:34 AM    Comparison: CT chest 6/16/2023    History:  Endotracheal tube positioning    Findings:  Single portable supine view of the chest. Endotracheal tube tip  projects over the high/mid thoracic trachea. Enteric tube courses  below the diaphragm and out of the field of view.. Trachea is midline.  Cardiomediastinal silhouette is within normal limits. No focal  consolidation. No pneumothorax or pleural effusion. The visualized  upper abdomen is unremarkable. No acute osseous abnormalities.      Impression    Impression: Endotracheal tube tip projects over the mid thoracic  trachea. No confluent consolidation    I have personally reviewed the examination and initial interpretation  and I agree with the findings.    JOSY JACQUES MD         SYSTEM ID:  [id number]   XR Abdomen Port 1 View     Status: None    Narrative    EXAMINATION:  XR ABDOMEN PORT 1 VIEW 11/18/2023     COMPARISON: None..    HISTORY: Check NG/OG tube placement.    FINDINGS: Two portable supine views of the abdomen and lower chest.  Enteric tube tip and sidehole project over the stomach. No pneumatosis  in the visualized abdomen. Surgical clips project over the right upper  quadrant. No portal venous gas.  The lung bases are unremarkable.  Nonobstructive bowel gas pattern. Moderate colonic stool burden.      Impression    IMPRESSION: Enteric tube tip and sidehole project over the stomach.  Moderate colonic stool burden    I have personally reviewed the examination and initial interpretation  and I agree with the findings.    JOSY JACQUES MD         SYSTEM ID:  [id number]   CT Head w/o Contrast     Status: None    Narrative    CT HEAD W/O CONTRAST 11/18/2023 9:23 AM    Provided History: AMS w/hx of recent trauma, rule out ICH    Comparison: MRI brain on 6 1/17, CT head 11/1/2017.    Technique: Using multidetector thin collimation helical acquisition  technique, axial, coronal and sagittal CT images from the skull base  to the vertex were obtained without intravenous contrast.     Findings:    No  intracranial hemorrhage. No mass effect. No midline shift. No  extra-axial fluid collection. The gray to white matter differentiation  of the cerebral hemispheres is preserved. Ventricles are proportionate  to the sulci. No sulcal effacement. The basal cisterns are patent.  Moderate diffuse cerebral volume loss. Periventricular white matter  hypodensities which are nonspecific, but likely represent chronic  small vessel ischemic disease.    The visualized paranasal sinuses are clear. Left mastoid air cells are  clear. Chronic opacification of the right mastoid air cells.. Orbits  appear unremarkable. No acute fracture.      Impression    Impression:   1. No acute intracranial pathology.  2. Chronic changes including leukoaraiosis, diffuse cerebral volume  loss, right mastoid air cell opacification.    I have personally reviewed the examination and initial interpretation  and I agree with the findings.    MARY HEARN MD         SYSTEM ID:  [id number]   XR Chest Port 1 View     Status: None    Narrative    Exam: XR CHEST PORT 1 VIEW, 11/19/2023 3:45 AM    Comparison: Chest x-ray 11/18/2023    History: ETT positioning    Findings:  Single portable supine view of the chest. Interval slight retraction  of the endotracheal tube with tip now projecting 5 cm above the  charlie, previously 2.8 cm. Enteric tube courses below the diaphragm  and out of the field-of-view. Trachea is midline. Cardiomediastinal  silhouette is within normal limits. No focal airspace opacity. No  pneumothorax or pleural effusion. The visualized upper abdomen is  unremarkable. No acute osseous abnormalities.      Impression    Impression:   Slight interval retraction of the endotracheal tube with tip now  projecting 5 cm above the charlie, previously 3.8 cm.    I have personally reviewed the examination and initial interpretation  and I agree with the findings.    SUKHWINDER REZA MD         SYSTEM ID:  [id number]   MR Brain w/o & w Contrast      Status: None    Narrative    EXAM: MR BRAIN W/O & W CONTRAST  11/19/2023 5:19 PM     HISTORY:  Concern for mental status change, concern for NCS seizures  and progressive MS       COMPARISON:  MRI 11/1/2017    TECHNIQUE: Multiplanar multisequence MRI of the brain performed with  and without contrast.    CONTRAST: 6ml Gadavist.    FINDINGS:    There is no mass effect, midline shift, or intracranial hemorrhage.  Multiple periventricular and subcortical white matter T2 hyperintense  foci, which appear similar in extent when compared to 11/1/2017 MRI.  Moderate cerebral atrophy. The ventricles are proportionate to the  cerebral sulci. Diffusion and susceptibility weighted images are  negative for acute/focal abnormality. Major intracranial vascular  structures are within normal limits.    No suspicious abnormality of the skull marrow signal. Clear paranasal  sinuses. Right greater than left mastoid effusions. No focal  abnormality of the pituitary gland, sella, skull base and upper  cervical spinal structures on sagittal images. The orbits are normal.      Impression    IMPRESSION:  1. No acute intracranial pathology to explain patient's  symptomatology.  2. Scattered T2 hyperintense foci throughout the subcortical and  periventricular white matter. Findings grossly unchanged when compared  to 11/1/2017 MRI and may be a sequelae of chronic small vessel  ischemic disease versus white matter demyelinating changes. No  abnormal intracranial enhancement is noted.  3. Moderate cerebral atrophy.    I have personally reviewed the examination and initial interpretation  and I agree with the findings.    MARY HEARN MD         SYSTEM ID:  [id number]   XR Chest Port 1 View     Status: None    Narrative    Exam: XR CHEST PORT 1 VIEW, 11/20/2023 9:58 PM    Comparison: Chest x-ray 11/19/2023    History: check et tube position    Findings:  Single portable semiupright view of the chest. Endotracheal tube  project over the mid  thoracic trachea, stable in position.  Endotracheal tube cuff diameter  measures approximately 3.2 cm.  Enteric tube sidehole projects over the stomach.     Trachea is midline. Cardiomediastinal silhouette is within normal  limits. Mildly increased streaky perihilar and bibasilar opacities. No  pneumothorax or pleural effusion. The visualized upper abdomen is  unremarkable. No acute osseous abnormalities.      Impression    Impression:   1. Stable support devices. Endotracheal tube cuff diameter measures  approximately 3.2 cm raising concern for overinflation.  2. Mildly increased streaky perihilar and bibasilar opacities, likely  atelectasis/edema.    I have personally reviewed the examination and initial interpretation  and I agree with the findings.    ESTEFANI JOHN MD         SYSTEM ID:  [id number]   Basic metabolic panel     Status: Abnormal   Result Value Ref Range    Sodium 135 135 - 145 mmol/L    Potassium 3.5 3.4 - 5.3 mmol/L    Chloride 97 (L) 98 - 107 mmol/L    Carbon Dioxide (CO2) 31 (H) 22 - 29 mmol/L    Anion Gap 7 7 - 15 mmol/L    Urea Nitrogen 18.4 8.0 - 23.0 mg/dL    Creatinine 1.74 (H) 0.51 - 0.95 mg/dL    GFR Estimate 31 (L) >60 mL/min/1.73m2    Calcium 9.6 8.8 - 10.2 mg/dL    Glucose 125 (H) 70 - 99 mg/dL   CBC with platelets and differential     Status: Abnormal   Result Value Ref Range    WBC Count 5.8 4.0 - 11.0 10e3/uL    RBC Count 2.63 (L) 3.80 - 5.20 10e6/uL    Hemoglobin 8.3 (L) 11.7 - 15.7 g/dL    Hematocrit 24.5 (L) 35.0 - 47.0 %    MCV 93 78 - 100 fL    MCH 31.6 26.5 - 33.0 pg    MCHC 33.9 31.5 - 36.5 g/dL    RDW 13.2 10.0 - 15.0 %    Platelet Count 171 150 - 450 10e3/uL    % Neutrophils 81 %    % Lymphocytes 12 %    % Monocytes 6 %    % Eosinophils 0 %    % Basophils 0 %    % Immature Granulocytes 1 %    NRBCs per 100 WBC 0 <1 /100    Absolute Neutrophils 4.7 1.6 - 8.3 10e3/uL    Absolute Lymphocytes 0.7 (L) 0.8 - 5.3 10e3/uL    Absolute Monocytes 0.4 0.0 - 1.3 10e3/uL    Absolute  Eosinophils 0.0 0.0 - 0.7 10e3/uL    Absolute Basophils 0.0 0.0 - 0.2 10e3/uL    Absolute Immature Granulocytes 0.0 <=0.4 10e3/uL    Absolute NRBCs 0.0 10e3/uL   INR     Status: Abnormal   Result Value Ref Range    INR 1.26 (H) 0.85 - 1.15   Partial thromboplastin time     Status: Normal   Result Value Ref Range    aPTT 30 22 - 38 Seconds   Glucose by meter     Status: Normal   Result Value Ref Range    GLUCOSE BY METER POCT 89 70 - 99 mg/dL   Blood gas venous     Status: Abnormal   Result Value Ref Range    pH Venous 7.27 (L) 7.32 - 7.43    pCO2 Venous 65 (H) 40 - 50 mm Hg    pO2 Venous 28 25 - 47 mm Hg    Bicarbonate Venous 30 (H) 21 - 28 mmol/L    Base Excess/Deficit 2.1 (H) -7.7 - 1.9 mmol/L    FIO2 32    CBC with platelets     Status: Abnormal   Result Value Ref Range    WBC Count 4.3 4.0 - 11.0 10e3/uL    RBC Count 2.42 (L) 3.80 - 5.20 10e6/uL    Hemoglobin 7.6 (L) 11.7 - 15.7 g/dL    Hematocrit 22.7 (L) 35.0 - 47.0 %    MCV 94 78 - 100 fL    MCH 31.4 26.5 - 33.0 pg    MCHC 33.5 31.5 - 36.5 g/dL    RDW 13.1 10.0 - 15.0 %    Platelet Count 127 (L) 150 - 450 10e3/uL   Comprehensive metabolic panel     Status: Abnormal   Result Value Ref Range    Sodium 138 135 - 145 mmol/L    Potassium 3.5 3.4 - 5.3 mmol/L    Carbon Dioxide (CO2) 30 (H) 22 - 29 mmol/L    Anion Gap 6 (L) 7 - 15 mmol/L    Urea Nitrogen 22.4 8.0 - 23.0 mg/dL    Creatinine 2.11 (H) 0.51 - 0.95 mg/dL    GFR Estimate 25 (L) >60 mL/min/1.73m2    Calcium 8.8 8.8 - 10.2 mg/dL    Chloride 102 98 - 107 mmol/L    Glucose 99 70 - 99 mg/dL    Alkaline Phosphatase 61 40 - 150 U/L    AST 32 0 - 45 U/L    ALT 16 0 - 50 U/L    Protein Total 6.3 (L) 6.4 - 8.3 g/dL    Albumin 3.4 (L) 3.5 - 5.2 g/dL    Bilirubin Total 0.2 <=1.2 mg/dL   Magnesium     Status: Abnormal   Result Value Ref Range    Magnesium 1.6 (L) 1.7 - 2.3 mg/dL   Phosphorus     Status: Normal   Result Value Ref Range    Phosphorus 3.7 2.5 - 4.5 mg/dL   Lactic acid whole blood     Status: Normal    Result Value Ref Range    Lactic Acid 1.1 0.7 - 2.0 mmol/L   Glucose by meter     Status: Abnormal   Result Value Ref Range    GLUCOSE BY METER POCT 103 (H) 70 - 99 mg/dL   Glucose by meter     Status: Normal   Result Value Ref Range    GLUCOSE BY METER POCT 88 70 - 99 mg/dL   Blood gas arterial     Status: Abnormal   Result Value Ref Range    pH Arterial 7.51 (H) 7.35 - 7.45    pCO2 Arterial 30 (L) 35 - 45 mm Hg    pO2 Arterial 97 80 - 105 mm Hg    FIO2 21     Bicarbonate Arterial 24 21 - 28 mmol/L    Base Excess/Deficit 1.0 -9.0 - 1.8 mmol/L    Aris's Test No    Asymptomatic Influenza A/B, RSV, & SARS-CoV2 PCR (COVID-19) Nasopharyngeal     Status: Normal    Specimen: Nasopharyngeal; Swab   Result Value Ref Range    Influenza A PCR Negative Negative    Influenza B PCR Negative Negative    RSV PCR Negative Negative    SARS CoV2 PCR Negative Negative    Narrative    Testing was performed using the Xpert Xpress CoV2/Flu/RSV Assay on the Cepheid GeneXpert Instrument. This test should be ordered for the detection of SARS-CoV-2, influenza, and RSV viruses in individuals who meet clinical and/or epidemiological criteria. Test performance is unknown in asymptomatic patients. This test is for in vitro diagnostic use under the FDA EUA for laboratories certified under CLIA to perform high or moderate complexity testing. This test has not been FDA cleared or approved. A negative result does not rule out the presence of PCR inhibitors in the specimen or target RNA in concentration below the limit of detection for the assay. If only one viral target is positive but coinfection with multiple targets is suspected, the sample should be re-tested with another FDA cleared, approved, or authorized test, if coinfection would change clinical management. This test was validated by the [facility] and [facility]. These laboratories are certified under the Clinical Laboratory Improvement Amendments of 1988 (CLIA-88) as qualified to  perform high complexity laboratory testing.   UA with Microscopic reflex to Culture     Status: Abnormal    Specimen: Urine, Catheter   Result Value Ref Range    Color Urine Yellow Colorless, Straw, Light Yellow, Yellow    Appearance Urine Slightly Cloudy (A) Clear    Glucose Urine Negative Negative mg/dL    Bilirubin Urine Negative Negative    Ketones Urine 20 (A) Negative mg/dL    Specific Gravity Urine 1.016 1.003 - 1.035    Blood Urine Large (A) Negative    pH Urine 5.0 5.0 - 7.0    Protein Albumin Urine 20 (A) Negative mg/dL    Urobilinogen Urine Normal Normal, 2.0 mg/dL    Nitrite Urine Negative Negative    Leukocyte Esterase Urine Large (A) Negative    WBC Clumps Urine Present (A) None Seen /HPF    Mucus Urine Present (A) None Seen /LPF    RBC Urine 45 (H) <=2 /HPF    WBC Urine >182 (H) <=5 /HPF    Squamous Epithelials Urine 1 <=1 /HPF    Hyaline Casts Urine 2 <=2 /LPF    Narrative    Urine Culture ordered based on laboratory criteria   Procalcitonin     Status: Normal   Result Value Ref Range    Procalcitonin 0.20 <0.50 ng/mL   Extra Tube     Status: None    Narrative    The following orders were created for panel order Extra Tube.  Procedure                               Abnormality         Status                     ---------                               -----------         ------                     Extra Blue Top Tube[[id number]]                              Final result               Extra Red Top Tube[[id number]]                               Final result               Extra Red Top Tube[[id number]]                               Final result                 Please view results for these tests on the individual orders.   Extra Blue Top Tube     Status: None   Result Value Ref Range    Hold Specimen JIC    Extra Red Top Tube     Status: None   Result Value Ref Range    Hold Specimen JIC    Extra Red Top Tube     Status: None   Result Value Ref Range    Hold Specimen JIC    Extra Tube     Status: None     Narrative    The following orders were created for panel order Extra Tube.  Procedure                               Abnormality         Status                     ---------                               -----------         ------                     Extra Green Top (Lithium...[[id number]]                      Final result               Extra Purple Top Tube[[id number]]                            Final result                 Please view results for these tests on the individual orders.   Extra Green Top (Lithium Heparin) Tube     Status: None   Result Value Ref Range    Hold Specimen JIC    Extra Purple Top Tube     Status: None   Result Value Ref Range    Hold Specimen JIC    TSH with free T4 reflex     Status: Normal   Result Value Ref Range    TSH 0.64 0.30 - 4.20 uIU/mL   Glucose by meter     Status: Normal   Result Value Ref Range    GLUCOSE BY METER POCT 89 70 - 99 mg/dL   Blood gas venous     Status: None   Result Value Ref Range    pH Venous 7.37 7.32 - 7.43    pCO2 Venous 47 40 - 50 mm Hg    pO2 Venous 39 25 - 47 mm Hg    Bicarbonate Venous 27 21 - 28 mmol/L    Base Excess/Deficit 1.1 -7.7 - 1.9 mmol/L    FIO2 21    Blood gas venous     Status: Abnormal   Result Value Ref Range    pH Venous 7.40 7.32 - 7.43    pCO2 Venous 44 40 - 50 mm Hg    pO2 Venous 28 25 - 47 mm Hg    Bicarbonate Venous 27 21 - 28 mmol/L    Base Excess/Deficit 2.2 (H) -7.7 - 1.9 mmol/L    FIO2 21    Glucose by meter     Status: Abnormal   Result Value Ref Range    GLUCOSE BY METER POCT 103 (H) 70 - 99 mg/dL   Basic metabolic panel     Status: Abnormal   Result Value Ref Range    Sodium 141 135 - 145 mmol/L    Potassium 3.2 (L) 3.4 - 5.3 mmol/L    Chloride 104 98 - 107 mmol/L    Carbon Dioxide (CO2) 26 22 - 29 mmol/L    Anion Gap 11 7 - 15 mmol/L    Urea Nitrogen 21.6 8.0 - 23.0 mg/dL    Creatinine 1.40 (H) 0.51 - 0.95 mg/dL    GFR Estimate 41 (L) >60 mL/min/1.73m2    Calcium 8.5 (L) 8.8 - 10.2 mg/dL    Glucose 100 (H) 70 - 99 mg/dL    Lactic acid whole blood     Status: Normal   Result Value Ref Range    Lactic Acid 1.5 0.7 - 2.0 mmol/L   Glucose by meter     Status: Abnormal   Result Value Ref Range    GLUCOSE BY METER POCT 101 (H) 70 - 99 mg/dL   CBC with platelets     Status: Abnormal   Result Value Ref Range    WBC Count 4.3 4.0 - 11.0 10e3/uL    RBC Count 1.71 (L) 3.80 - 5.20 10e6/uL    Hemoglobin 5.2 (LL) 11.7 - 15.7 g/dL    Hematocrit 16.1 (L) 35.0 - 47.0 %    MCV 94 78 - 100 fL    MCH 30.4 26.5 - 33.0 pg    MCHC 32.3 31.5 - 36.5 g/dL    RDW 13.2 10.0 - 15.0 %    Platelet Count 119 (L) 150 - 450 10e3/uL   Basic metabolic panel     Status: Abnormal   Result Value Ref Range    Sodium 139 135 - 145 mmol/L    Potassium 3.6 3.4 - 5.3 mmol/L    Chloride 105 98 - 107 mmol/L    Carbon Dioxide (CO2) 27 22 - 29 mmol/L    Anion Gap 7 7 - 15 mmol/L    Urea Nitrogen 21.3 8.0 - 23.0 mg/dL    Creatinine 1.22 (H) 0.51 - 0.95 mg/dL    GFR Estimate 48 (L) >60 mL/min/1.73m2    Calcium 8.0 (L) 8.8 - 10.2 mg/dL    Glucose 108 (H) 70 - 99 mg/dL   Magnesium     Status: Abnormal   Result Value Ref Range    Magnesium 1.4 (L) 1.7 - 2.3 mg/dL   Phosphorus     Status: Abnormal   Result Value Ref Range    Phosphorus 2.1 (L) 2.5 - 4.5 mg/dL   Blood gas venous     Status: Abnormal   Result Value Ref Range    pH Venous 7.42 7.32 - 7.43    pCO2 Venous 39 (L) 40 - 50 mm Hg    pO2 Venous 39 25 - 47 mm Hg    Bicarbonate Venous 25 21 - 28 mmol/L    Base Excess/Deficit 0.6 -7.7 - 1.9 mmol/L    FIO2 21    Glucose by meter     Status: Normal   Result Value Ref Range    GLUCOSE BY METER POCT 97 70 - 99 mg/dL   Glucose by meter     Status: Abnormal   Result Value Ref Range    GLUCOSE BY METER POCT 102 (H) 70 - 99 mg/dL   CBC with platelets     Status: Abnormal   Result Value Ref Range    WBC Count 8.2 4.0 - 11.0 10e3/uL    RBC Count 1.98 (L) 3.80 - 5.20 10e6/uL    Hemoglobin 6.2 (LL) 11.7 - 15.7 g/dL    Hematocrit 18.9 (L) 35.0 - 47.0 %    MCV 96 78 - 100 fL    MCH 31.3 26.5 -  33.0 pg    MCHC 32.8 31.5 - 36.5 g/dL    RDW 13.4 10.0 - 15.0 %    Platelet Count 145 (L) 150 - 450 10e3/uL   INR     Status: Abnormal   Result Value Ref Range    INR 1.17 (H) 0.85 - 1.15   Partial thromboplastin time     Status: Normal   Result Value Ref Range    aPTT 35 22 - 38 Seconds   Fibrinogen activity     Status: Normal   Result Value Ref Range    Fibrinogen Activity 448 170 - 490 mg/dL   Glucose by meter     Status: Abnormal   Result Value Ref Range    GLUCOSE BY METER POCT 125 (H) 70 - 99 mg/dL   Basic metabolic panel     Status: Abnormal   Result Value Ref Range    Sodium 138 135 - 145 mmol/L    Potassium 4.6 3.4 - 5.3 mmol/L    Chloride 106 98 - 107 mmol/L    Carbon Dioxide (CO2) 26 22 - 29 mmol/L    Anion Gap 6 (L) 7 - 15 mmol/L    Urea Nitrogen 19.4 8.0 - 23.0 mg/dL    Creatinine 1.04 (H) 0.51 - 0.95 mg/dL    GFR Estimate 58 (L) >60 mL/min/1.73m2    Calcium 8.3 (L) 8.8 - 10.2 mg/dL    Glucose 131 (H) 70 - 99 mg/dL   Magnesium     Status: Normal   Result Value Ref Range    Magnesium 2.0 1.7 - 2.3 mg/dL   Phosphorus     Status: Abnormal   Result Value Ref Range    Phosphorus 1.8 (L) 2.5 - 4.5 mg/dL   Lactic acid whole blood     Status: Normal   Result Value Ref Range    Lactic Acid 1.0 0.7 - 2.0 mmol/L   CK total     Status: Abnormal   Result Value Ref Range     (H) 26 - 192 U/L   Glucose by meter     Status: Abnormal   Result Value Ref Range    GLUCOSE BY METER POCT 138 (H) 70 - 99 mg/dL   Hemoglobin     Status: Abnormal   Result Value Ref Range    Hemoglobin 9.0 (L) 11.7 - 15.7 g/dL   Blood gas venous     Status: Abnormal   Result Value Ref Range    pH Venous 7.36 7.32 - 7.43    pCO2 Venous 49 40 - 50 mm Hg    pO2 Venous 22 (L) 25 - 47 mm Hg    Bicarbonate Venous 28 21 - 28 mmol/L    Base Excess/Deficit 1.6 -7.7 - 1.9 mmol/L    FIO2 21    Glucose by meter     Status: Normal   Result Value Ref Range    GLUCOSE BY METER POCT 87 70 - 99 mg/dL   Phosphorus     Status: Normal   Result Value Ref Range     Phosphorus 2.9 2.5 - 4.5 mg/dL   CBC with platelets     Status: Abnormal   Result Value Ref Range    WBC Count 7.8 4.0 - 11.0 10e3/uL    RBC Count 2.82 (L) 3.80 - 5.20 10e6/uL    Hemoglobin 8.3 (L) 11.7 - 15.7 g/dL    Hematocrit 24.1 (L) 35.0 - 47.0 %    MCV 86 78 - 100 fL    MCH 29.4 26.5 - 33.0 pg    MCHC 34.4 31.5 - 36.5 g/dL    RDW 18.0 (H) 10.0 - 15.0 %    Platelet Count 154 150 - 450 10e3/uL   Basic metabolic panel     Status: Abnormal   Result Value Ref Range    Sodium 139 135 - 145 mmol/L    Potassium 4.3 3.4 - 5.3 mmol/L    Chloride 104 98 - 107 mmol/L    Carbon Dioxide (CO2) 25 22 - 29 mmol/L    Anion Gap 10 7 - 15 mmol/L    Urea Nitrogen 16.2 8.0 - 23.0 mg/dL    Creatinine 0.90 0.51 - 0.95 mg/dL    GFR Estimate 69 >60 mL/min/1.73m2    Calcium 8.2 (L) 8.8 - 10.2 mg/dL    Glucose 90 70 - 99 mg/dL   Magnesium     Status: Normal   Result Value Ref Range    Magnesium 2.0 1.7 - 2.3 mg/dL   Phosphorus     Status: Normal   Result Value Ref Range    Phosphorus 2.6 2.5 - 4.5 mg/dL   Glucose by meter     Status: Abnormal   Result Value Ref Range    GLUCOSE BY METER POCT 100 (H) 70 - 99 mg/dL   Glucose by meter     Status: Normal   Result Value Ref Range    GLUCOSE BY METER POCT 76 70 - 99 mg/dL   Glucose by meter     Status: Normal   Result Value Ref Range    GLUCOSE BY METER POCT 88 70 - 99 mg/dL   Hemoglobin     Status: Abnormal   Result Value Ref Range    Hemoglobin 8.1 (L) 11.7 - 15.7 g/dL   Glucose by meter     Status: Normal   Result Value Ref Range    GLUCOSE BY METER POCT 97 70 - 99 mg/dL   Glucose by meter     Status: Abnormal   Result Value Ref Range    GLUCOSE BY METER POCT 133 (H) 70 - 99 mg/dL   CBC with platelets     Status: Abnormal   Result Value Ref Range    WBC Count 4.2 4.0 - 11.0 10e3/uL    RBC Count 2.39 (L) 3.80 - 5.20 10e6/uL    Hemoglobin 7.0 (L) 11.7 - 15.7 g/dL    Hematocrit 21.4 (L) 35.0 - 47.0 %    MCV 90 78 - 100 fL    MCH 29.3 26.5 - 33.0 pg    MCHC 32.7 31.5 - 36.5 g/dL    RDW 18.1  (H) 10.0 - 15.0 %    Platelet Count 132 (L) 150 - 450 10e3/uL   Phosphorus     Status: Abnormal   Result Value Ref Range    Phosphorus 2.0 (L) 2.5 - 4.5 mg/dL   Glucose by meter     Status: Abnormal   Result Value Ref Range    GLUCOSE BY METER POCT 162 (H) 70 - 99 mg/dL   Magnesium     Status: Normal   Result Value Ref Range    Magnesium 1.8 1.7 - 2.3 mg/dL   Comprehensive metabolic panel     Status: Abnormal   Result Value Ref Range    Sodium 142 135 - 145 mmol/L    Potassium 3.7 3.4 - 5.3 mmol/L    Carbon Dioxide (CO2) 27 22 - 29 mmol/L    Anion Gap 7 7 - 15 mmol/L    Urea Nitrogen 18.2 8.0 - 23.0 mg/dL    Creatinine 0.80 0.51 - 0.95 mg/dL    GFR Estimate 80 >60 mL/min/1.73m2    Calcium 7.6 (L) 8.8 - 10.2 mg/dL    Chloride 108 (H) 98 - 107 mmol/L    Glucose 128 (H) 70 - 99 mg/dL    Alkaline Phosphatase 55 40 - 150 U/L    AST 33 0 - 45 U/L    ALT 18 0 - 50 U/L    Protein Total 5.5 (L) 6.4 - 8.3 g/dL    Albumin 2.7 (L) 3.5 - 5.2 g/dL    Bilirubin Total 0.4 <=1.2 mg/dL   Glucose by meter     Status: Abnormal   Result Value Ref Range    GLUCOSE BY METER POCT 146 (H) 70 - 99 mg/dL   CBC with platelets     Status: Abnormal   Result Value Ref Range    WBC Count 4.9 4.0 - 11.0 10e3/uL    RBC Count 3.02 (L) 3.80 - 5.20 10e6/uL    Hemoglobin 9.1 (L) 11.7 - 15.7 g/dL    Hematocrit 27.7 (L) 35.0 - 47.0 %    MCV 92 78 - 100 fL    MCH 30.1 26.5 - 33.0 pg    MCHC 32.9 31.5 - 36.5 g/dL    RDW 17.1 (H) 10.0 - 15.0 %    Platelet Count 144 (L) 150 - 450 10e3/uL   Glucose by meter     Status: Abnormal   Result Value Ref Range    GLUCOSE BY METER POCT 130 (H) 70 - 99 mg/dL   Phosphorus     Status: Abnormal   Result Value Ref Range    Phosphorus 1.9 (L) 2.5 - 4.5 mg/dL   Blood gas venous     Status: Abnormal   Result Value Ref Range    pH Venous 7.42 7.32 - 7.43    pCO2 Venous 41 40 - 50 mm Hg    pO2 Venous 37 25 - 47 mm Hg    Bicarbonate Venous 27 21 - 28 mmol/L    Base Excess/Deficit 2.2 (H) -7.7 - 1.9 mmol/L    FIO2 25    Glucose by  meter     Status: Abnormal   Result Value Ref Range    GLUCOSE BY METER POCT 132 (H) 70 - 99 mg/dL   Extra Tube     Status: None    Narrative    The following orders were created for panel order Extra Tube.  Procedure                               Abnormality         Status                     ---------                               -----------         ------                     Extra Green Top (Lithium...[[id number]]                      Final result                 Please view results for these tests on the individual orders.   Extra Green Top (Lithium Heparin) Tube     Status: None   Result Value Ref Range    Hold Specimen [facility]    Glucose by meter     Status: Abnormal   Result Value Ref Range    GLUCOSE BY METER POCT 113 (H) 70 - 99 mg/dL   Phosphorus     Status: Normal   Result Value Ref Range    Phosphorus 3.8 2.5 - 4.5 mg/dL   Glucose by meter     Status: Normal   Result Value Ref Range    GLUCOSE BY METER POCT 92 70 - 99 mg/dL   Glucose by meter     Status: Normal   Result Value Ref Range    GLUCOSE BY METER POCT 96 70 - 99 mg/dL   Comprehensive metabolic panel     Status: Abnormal   Result Value Ref Range    Sodium 140 135 - 145 mmol/L    Potassium 3.8 3.4 - 5.3 mmol/L    Carbon Dioxide (CO2) 24 22 - 29 mmol/L    Anion Gap 8 7 - 15 mmol/L    Urea Nitrogen 14.6 8.0 - 23.0 mg/dL    Creatinine 0.68 0.51 - 0.95 mg/dL    GFR Estimate >90 >60 mL/min/1.73m2    Calcium 7.6 (L) 8.8 - 10.2 mg/dL    Chloride 108 (H) 98 - 107 mmol/L    Glucose 98 70 - 99 mg/dL    Alkaline Phosphatase 56 40 - 150 U/L    AST 27 0 - 45 U/L    ALT 18 0 - 50 U/L    Protein Total 5.8 (L) 6.4 - 8.3 g/dL    Albumin 2.8 (L) 3.5 - 5.2 g/dL    Bilirubin Total 0.4 <=1.2 mg/dL   Phosphorus     Status: Abnormal   Result Value Ref Range    Phosphorus 2.4 (L) 2.5 - 4.5 mg/dL   CBC with platelets     Status: Abnormal   Result Value Ref Range    WBC Count 4.3 4.0 - 11.0 10e3/uL    RBC Count 3.19 (L) 3.80 - 5.20 10e6/uL    Hemoglobin 9.4 (L) 11.7 - 15.7  g/dL    Hematocrit 29.3 (L) 35.0 - 47.0 %    MCV 92 78 - 100 fL    MCH 29.5 26.5 - 33.0 pg    MCHC 32.1 31.5 - 36.5 g/dL    RDW 17.1 (H) 10.0 - 15.0 %    Platelet Count 154 150 - 450 10e3/uL   Magnesium     Status: Normal   Result Value Ref Range    Magnesium 1.7 1.7 - 2.3 mg/dL   Adult Type and Screen     Status: None   Result Value Ref Range    ABO/RH(D) A NEG     Antibody Screen Negative Negative    SPECIMEN EXPIRATION DATE [id number]    Prepare red blood cells (unit)     Status: None   Result Value Ref Range    Blood Component Type Red Blood Cells     Product Code C2314T49     Unit Status Transfused     Unit Number [id number]     CROSSMATCH Compatible     CODING SYSTEM [id number]     ISSUE DATE AND TIME [id number]     UNIT ABO/RH A-     UNIT TYPE ISBT 0600    Adult Type and Screen     Status: None   Result Value Ref Range    ABO/RH(D) A NEG     Antibody Screen Negative Negative    SPECIMEN EXPIRATION DATE [id number]    Prepare red blood cells (unit)     Status: None   Result Value Ref Range    Blood Component Type Red Blood Cells     Product Code A8562P08     Unit Status Transfused     Unit Number [id number]     CROSSMATCH Compatible     CODING SYSTEM [id number]     ISSUE DATE AND TIME [id number]     UNIT ABO/RH A-     UNIT TYPE ISBT 0600    Urine Culture     Status: Abnormal    Specimen: Urine, Catheter   Result Value Ref Range    Culture 10,000-50,000 CFU/mL Klebsiella pneumoniae (A)        Susceptibility    Klebsiella pneumoniae - VICKI     Ampicillin*  Resistant       * Intrinsically Resistant     Ampicillin/ Sulbactam  Susceptible ug/mL     Piperacillin/Tazobactam  Susceptible ug/mL     Cefazolin*  Susceptible ug/mL      * Cefazolin VICKI breakpoints are for the treatment of uncomplicated urinary tract infections. For the treatment of systemic infections, please contact the laboratory for additional testing.     Cefoxitin  Susceptible ug/mL     Ceftazidime  Susceptible ug/mL     Ceftriaxone   Susceptible ug/mL     Cefepime  Susceptible ug/mL     Gentamicin  Susceptible ug/mL     Tobramycin  Susceptible ug/mL     Ciprofloxacin  Intermediate ug/mL     Levofloxacin  Intermediate ug/mL     Nitrofurantoin  Resistant ug/mL     Trimethoprim/Sulfamethoxazole  Susceptible ug/mL   Blood Culture Hand, Left     Status: Normal (Preliminary result)    Specimen: Hand, Left; Blood   Result Value Ref Range    Culture No growth after 4 days    Blood Culture Hand, Right     Status: Normal (Preliminary result)    Specimen: Hand, Right; Blood   Result Value Ref Range    Culture No growth after 4 days    CBC with Platelets & Differential     Status: Abnormal    Narrative    The following orders were created for panel order CBC with Platelets & Differential.  Procedure                               Abnormality         Status                     ---------                               -----------         ------                     CBC with platelets and d...[[id number]]  Abnormal            Final result                 Please view results for these tests on the individual orders.   ABO/Rh type and screen     Status: None    Narrative    The following orders were created for panel order ABO/Rh type and screen.  Procedure                               Abnormality         Status                     ---------                               -----------         ------                     Adult Type and Screen[[id number]]                            Final result                 Please view results for these tests on the individual orders.   ABO/Rh type and screen     Status: None    Narrative    The following orders were created for panel order ABO/Rh type and screen.  Procedure                               Abnormality         Status                     ---------                               -----------         ------                     Adult Type and Screen[[id number]]                            Final result                 Please view  results for these tests on the individual orders.   EEG Video 2-12 HRS Ummonitored     Status: None    Narrative    EEG Video 2-12 HRS Ummonitored Result    VIDEO EEG DATE: 2023  VIDEO EEG [id number]  VIDEO EEG DAY#: 1  VIDEO EEG SOURCE FILE DURATION: 3 hours and 30 minutes     PATIENT HISTORY: 66-year old with multiple sclerosis and multiple   fractures.  She presents with altered mental status (progressive   obtundation) requiring intubation.  She is in ICU.  She is being treated   with propofol 40 to 20 mcg/kg/min and the initial of portions of the   study.  Propofol was then discontinued.  Dexmedetomidine was initiated in   the last half hour of the study.  Unusual movements were noted at 1 point   and concern was raised that these may represent epileptic seizures.    TECHNICAL SUMMARY: This is a video-EEG monitoring study. EEG was recorded   from 23 scalp electrodes placed according to the 10-20 international   system. Additional electrodes were utilized for referencing, artifact   detection, and recording from other cerebral regions. Qualified   technicians attached EEG electrodes, set up the study, monitored and   reviewed EEG recordings, and disconnected EEG electrodes when appropriate.   Video was continuously recorded. Video was reviewed for clinical   correlation and to assist with EEG interpretations.     BACKGROUND ACTIVITY: In the initial portion of the study, while patient   was treated with propofol, EEG was discontinuous.  40  V delta alternated   with 3 to 4-second periods of significant attenuation in which   electrocerebral activity was consistently less than 10  V.  Following good   discontinuation of propofol, EEG became more continuous with 40  V diffuse   delta predominating.  Following administration of dexmedetomidine, EEG   became discontinuous once again.    ACTIVATION PROCEDURE: Formal stimulation not performed.    OTHER INTERICTAL ABNORMALITIES: No epileptiform  discharges.    ICTAL ABNORMALITIES: No electrographic seizures.    IMPRESSION: Abnormal.  1) when treated with sedative drips, EEG was discontinuous in background   activity in general was consistent with moderate to severe diffuse   encephalopathy.  When sedative drips were and not used, a continuous delta   background was seen, consistent with moderate diffuse encephalopathy.  2) epileptiform discharges or seizures were not seen at any point   including a reasonable period of time when patient was not being treated   with sedative drips.  No evidence of nonconvulsive status epilepticus.    Cruz Atwood MD  EPILEPSY STAFF        Medications   acetaminophen (TYLENOL) tablet 500-1,000 mg (1,000 mg Oral $Given 11/22/23 2159)   armodafinil (NUVIGIL) 200 mg ( Oral Automatically Held 11/26/23 1400)   DULoxetine (CYMBALTA) DR capsule 80 mg (80 mg Oral $Given 11/22/23 0755)   ferrous sulfate (FEROSUL) tablet 325 mg ( Oral Automatically Held 11/26/23 0800)   gabapentin (NEURONTIN) capsule 300 mg ( Oral Automatically Held 11/26/23 2000)   hydrochlorothiazide (HYDRODIURIL) tablet 12.5 mg ( Oral Unheld by provider 11/22/23 0802)   latanoprost (XALATAN) 0.005 % ophthalmic solution 1 drop (1 drop Both Eyes $Given 11/22/23 0812)   multivitamin w/minerals (THERA-VIT-M) tablet 1 tablet (1 tablet Oral $Given 11/22/23 2022)   ondansetron (ZOFRAN ODT) ODT tab 4 mg (has no administration in time range)     Or   ondansetron (ZOFRAN) injection 4 mg (has no administration in time range)   lidocaine 1 % 0.1-1 mL (has no administration in time range)   lidocaine (LMX4) cream (has no administration in time range)   sodium chloride (PF) 0.9% PF flush 3 mL (has no administration in time range)   sodium chloride (PF) 0.9% PF flush 3 mL (3 mLs Intracatheter Not Given 11/23/23 0225)   heparin ANTICOAGULANT injection 5,000 Units (5,000 Units Subcutaneous $Given 11/22/23 2202)   naloxone (NARCAN) injection 0.2 mg (has no administration  in time range)     Or   naloxone (NARCAN) injection 0.4 mg (has no administration in time range)     Or   naloxone (NARCAN) injection 0.2 mg (has no administration in time range)     Or   naloxone (NARCAN) injection 0.4 mg (has no administration in time range)   chlorhexidine (PERIDEX) 0.12 % solution 15 mL (15 mLs Mouth/Throat Not Given 11/22/23 2028)   albuterol (PROVENTIL) neb solution 2.5 mg (has no administration in time range)   glucose gel 15-30 g (has no administration in time range)     Or   dextrose 50 % injection 25-50 mL (has no administration in time range)     Or   glucagon injection 1 mg (has no administration in time range)   cefTRIAXone (ROCEPHIN) 1 g vial to attach to  mL bag for ADULTS or NS 50 mL bag for PEDS (1 g Intravenous $New Bag 11/22/23 2022)   calcium carbonate (OS-JORDON) tablet 600 mg (0 mg Oral Hold 11/22/23 0757)   baclofen (LIORESAL) tablet 10 mg (10 mg Oral $Given 11/22/23 0756)   dextrose 10% infusion (has no administration in time range)   acyclovir (ZOVIRAX) tablet 400 mg ( Oral or Feeding Tube Automatically Held 11/28/23 2000)   baclofen (LIORESAL) tablet 10 mg (10 mg Oral or Feeding Tube $Given 11/22/23 1315)   baclofen (LIORESAL) tablet 20 mg (20 mg Oral or Feeding Tube $Given 11/22/23 2159)   levothyroxine (SYNTHROID/LEVOTHROID) tablet 100 mcg (100 mcg Oral or Feeding Tube $Given 11/22/23 0816)   melatonin tablet 1 mg (1 mg Oral or Feeding Tube $Given 11/22/23 2022)   famotidine (PEPCID) tablet 40 mg (40 mg Oral or Feeding Tube $Given 11/22/23 2159)   oxyCODONE IR (ROXICODONE) half-tab 2.5 mg (2.5 mg Oral $Given 11/23/23 0216)   sodium chloride 0.9% BOLUS 1-250 mL (has no administration in time range)   lidocaine (viscous) (XYLOCAINE) 2 % solution 5 mL (0 mLs Topical Hold 11/21/23 1232)   sennosides (SENOKOT) syrup 10 mL (has no administration in time range)   polyethylene glycol (MIRALAX) Packet 17 g (has no administration in time range)   oxyBUTYnin ER (DITROPAN XL) 24  hr tablet 10 mg (10 mg Oral $Given 11/22/23 0754)   acetaminophen (TYLENOL) Suppository 650 mg (has no administration in time range)   acyclovir (ZOVIRAX) 400 mg in sodium chloride 0.9 % 108 mL intermittent infusion (400 mg Intravenous $New Bag 11/22/23 2159)   cholestyramine (QUESTRAN) Packet 4 g (4 g Oral $Given 11/22/23 2022)   lactobacillus rhamnosus (GG) (CULTURELL) capsule 1 capsule (1 capsule Oral $Given 11/22/23 2022)   HYDROcodone-acetaminophen (NORCO) 5-325 MG per tablet 1 tablet (1 tablet Oral $Given 11/16/23 2357)   ketorolac (TORADOL) injection 15 mg (15 mg Intramuscular $Given 11/17/23 0046)   baclofen (LIORESAL) tablet 20 mg (20 mg Oral $Given 11/17/23 0046)   HYDROmorphone (PF) (DILAUDID) injection 0.5 mg (0.5 mg Intravenous $Given 11/17/23 0518)   naloxone (NARCAN) 0.4 MG/ML injection (0.3 mg  $Given 11/17/23 1136)   naloxone (NARCAN) 0.4 MG/ML injection (0.4 mg  $Given 11/18/23 0514)   lactated ringers BOLUS 500 mL (500 mLs Intravenous $New Bag 11/18/23 1514)   lactated ringers BOLUS 500 mL (500 mLs Intravenous $New Bag 11/18/23 1848)   potassium chloride (KAYCIEL) solution 40 mEq (40 mEq Oral $Given 11/18/23 2002)   lactated ringers BOLUS 500 mL (500 mLs Intravenous $New Bag 11/18/23 2333)   lactated ringers BOLUS 500 mL (500 mLs Intravenous $New Bag 11/19/23 0523)   potassium chloride (KAYCIEL) solution 40 mEq (40 mEq Oral $Given 11/19/23 0645)   magnesium sulfate 2 g in 50 mL sterile water intermittent infusion (2 g Intravenous $New Bag 11/19/23 0803)   potassium & sodium phosphates (NEUTRA-PHOS) Packet 1 packet (1 packet Oral $Given 11/19/23 1236)   lactated ringers BOLUS 500 mL (500 mLs Intravenous $New Bag 11/19/23 0906)   magnesium sulfate 2 g in 50 mL sterile water intermittent infusion (2 g Intravenous $New Bag 11/19/23 1237)   gadobutrol (GADAVIST) injection 6.05 mL (6.05 mLs Intravenous $Given 11/19/23 2163)   sodium phosphate 15 mmol in sodium chloride 0.9 % 250 mL intermittent infusion  (15 mmol Intravenous $Given 11/19/23 2030)   magnesium oxide (MAG-OX) tablet 400 mg (400 mg Oral $Given 11/20/23 1213)   sodium phosphate 9 mmol in sodium chloride 0.9 % 250 mL intermittent infusion (9 mmol Intravenous $Given 11/20/23 0649)   sodium phosphate 9 mmol in sodium chloride 0.9 % 250 mL intermittent infusion (9 mmol Intravenous $Given 11/21/23 0110)   potassium chloride (KLOR-CON) Packet 20 mEq (20 mEq Oral or Feeding Tube $Given 11/21/23 0625)   magnesium sulfate 2 g in 50 mL sterile water intermittent infusion (2 g Intravenous $New Bag 11/21/23 0625)   sodium phosphate 15 mmol in sodium chloride 0.9 % 250 mL intermittent infusion (15 mmol Intravenous $Given 11/21/23 1759)   potassium chloride 10 mEq in 100 mL sterile water infusion (10 mEq Intravenous $New Bag 11/22/23 0703)   magnesium sulfate 2 g in 50 mL sterile water intermittent infusion (2 g Intravenous $New Bag 11/22/23 0559)   sodium phosphate 9 mmol in sodium chloride 0.9 % 250 mL intermittent infusion (9 mmol Intravenous $Given 11/22/23 0615)   hydrochlorothiazide (HYDRODIURIL) tablet 12.5 mg (12.5 mg Oral $Given 11/22/23 1507)     Labs Ordered and Resulted from Time of ED Arrival to Time of ED Departure   BASIC METABOLIC PANEL - Abnormal       Result Value    Sodium 135      Potassium 3.5      Chloride 97 (*)     Carbon Dioxide (CO2) 31 (*)     Anion Gap 7      Urea Nitrogen 18.4      Creatinine 1.74 (*)     GFR Estimate 31 (*)     Calcium 9.6      Glucose 125 (*)    CBC WITH PLATELETS AND DIFFERENTIAL - Abnormal    WBC Count 5.8      RBC Count 2.63 (*)     Hemoglobin 8.3 (*)     Hematocrit 24.5 (*)     MCV 93      MCH 31.6      MCHC 33.9      RDW 13.2      Platelet Count 171      % Neutrophils 81      % Lymphocytes 12      % Monocytes 6      % Eosinophils 0      % Basophils 0      % Immature Granulocytes 1      NRBCs per 100 WBC 0      Absolute Neutrophils 4.7      Absolute Lymphocytes 0.7 (*)     Absolute Monocytes 0.4      Absolute  Eosinophils 0.0      Absolute Basophils 0.0      Absolute Immature Granulocytes 0.0      Absolute NRBCs 0.0     INR - Abnormal    INR 1.26 (*)    PARTIAL THROMBOPLASTIN TIME - Normal    aPTT 30     GLUCOSE BY METER - Normal    GLUCOSE BY METER POCT 89     TYPE AND SCREEN, ADULT    ABO/RH(D) A NEG      Antibody Screen Negative      SPECIMEN EXPIRATION DATE [id number]     RED BLOOD CELLS HAVE AVAILABLE (UNIT)   ABO/RH TYPE AND SCREEN     XR Chest Port 1 View   Final Result   Impression:    1. Stable support devices. Endotracheal tube cuff diameter measures   approximately 3.2 cm raising concern for overinflation.   2. Mildly increased streaky perihilar and bibasilar opacities, likely   atelectasis/edema.      I have personally reviewed the examination and initial interpretation   and I agree with the findings.      ESTEFANI JOHN MD            SYSTEM ID:  [id number]      MR Brain w/o & w Contrast   Final Result   IMPRESSION:   1. No acute intracranial pathology to explain patient's   symptomatology.   2. Scattered T2 hyperintense foci throughout the subcortical and   periventricular white matter. Findings grossly unchanged when compared   to 11/1/2017 MRI and may be a sequelae of chronic small vessel   ischemic disease versus white matter demyelinating changes. No   abnormal intracranial enhancement is noted.   3. Moderate cerebral atrophy.      I have personally reviewed the examination and initial interpretation   and I agree with the findings.      MARY HEARN MD            SYSTEM ID:  [id number]      XR Chest Port 1 View   Final Result   Impression:    Slight interval retraction of the endotracheal tube with tip now   projecting 5 cm above the charlie, previously 3.8 cm.      I have personally reviewed the examination and initial interpretation   and I agree with the findings.      SUKHWINDER REZA MD            SYSTEM ID:  [id number]      XR Knee Port Right 1/2 Views   Final Result   IMPRESSION: Impacted,  mildly comminuted fracture of the distal femoral metaphysis. No change in fracture position or alignment. No significant deformity/displacement of the femoral condyles are normal in the joint alignment. Soft tissue swelling in the    knee. Bones are demineralized. Cast in place.      CT Head w/o Contrast   Final Result   Impression:    1. No acute intracranial pathology.   2. Chronic changes including leukoaraiosis, diffuse cerebral volume   loss, right mastoid air cell opacification.      I have personally reviewed the examination and initial interpretation   and I agree with the findings.      MARY HEARN MD            SYSTEM ID:  [id number]      XR Abdomen Port 1 View   Final Result   IMPRESSION: Enteric tube tip and sidehole project over the stomach.   Moderate colonic stool burden      I have personally reviewed the examination and initial interpretation   and I agree with the findings.      JOSY JACQUES MD            SYSTEM ID:  [id number]      XR Chest Port 1 View   Final Result   Impression: Endotracheal tube tip projects over the mid thoracic   trachea. No confluent consolidation      I have personally reviewed the examination and initial interpretation   and I agree with the findings.      JOSY JACQUES MD            SYSTEM ID:  [id number]      CT Knee Right w/o Contrast   Final Result   IMPRESSION:    1.  Acute comminuted mildly displaced fracture of the distal femoral   metaphysis with intra-articular extension.      JOCELYNN LYON MD            SYSTEM ID:  [id number]      XR Femur Right 2 Views   Final Result   IMPRESSION: Bones are demineralized. Acute comminuted and impacted fracture of the distal right femoral metadiaphysis without definite joint involvement. No significant joint effusion. Post ORIF of the proximal right femur. Hardware in satisfactory    position without complication detected.      XR Knee Right 3 Views   Final Result   IMPRESSION: Bones are demineralized. Acute comminuted  and impacted fracture of the distal right femoral metadiaphysis without definite joint involvement. No significant joint effusion. Post ORIF of the proximal right femur. Hardware in satisfactory    position without complication detected.         XR Pelvis 1/2 Views   Final Result   IMPRESSION: Demineralization of the visualized bones. Degenerative changes lower lumbar spine and joints of the pelvis. No acute fracture or dislocation. Dynamic hip screw and sideplate proximal right femur, partially visualized, unchanged. Prominent    amount of formed stool material within the visualized redundant colon.      XR Ankle Left G/E 3 Views   Final Result   IMPRESSION: Probable nondisplaced fracture of the medial malleolus. No other acute fracture or dislocation. The ankle mortise is intact. Postoperative change in the distal tibia and fibula. The bones are demineralized.      XR Knee Left 3 Views   Final Result   IMPRESSION: Mild tricompartmental degenerative arthritis left knee without acute fracture, dislocation, effusion or calcified loose bodies. Postoperative changes of plate and screw fixation of previously healed distal left femoral metaphyseal fracture,    interval healing of the fracture lines. Intramedullary nail with proximal locking screws in the visualized left tibia. Visualized hardware is well seated with good alignment. No acute fracture or dislocation. Healed fracture deformity of the proximal    metaphysis of the left fibula. No x-ray evidence of avascular necrosis. Demineralization of the visualized bones.             Critical care was not performed.     Medical Decision Making  The patient's presentation was of moderate complexity (an acute complicated injury).    The patient's evaluation involved:  ordering and/or review of 3+ test(s) in this encounter (see separate area of note for details)    The patient's management necessitated high risk (a decision regarding hospitalization).    Assessment & Plan       Marylee Woods is a 66 year old female with a past medical history significant for multiple sclerosis, stage III CKD, osteoporosis, and multiple orthopedic surgeries presents to the ED for evaluation of leg injury due to fall.  Patient is chronically ill-appearing but not acutely toxic in the department, was hypotensive in triage but normalized once roomed without intervention to 100/52, and has other vital signs within normal limits.  She has partially contracted appearing extremities held in flexion with swelling to the right knee that is asymmetric from the left knee, and tender to palpation.  Range of motion is restricted by underlying MS as well as acute pain in the area.  Sensation and pulses intact distally in upper and lower extremities bilaterally.  Attempted to obtain x-rays of the right knee, pelvis and bilateral hips, left ankle due to patient complaints of pain in those areas after the fall, however on initial attempt we could not obtain the adequate images because of pain.  IV was placed and patient was given additional pain medications with plan for reattempt.  At the moment, patient has too much pain to be able to safely get around the home as she was already restricted with her mobility at baseline, and now in the setting of acute swelling and pain is not able to get herself up to the bathroom.  She will be admitted for PT and OT assessment and evaluation, further pain control, follow-up on imaging, and further management including discussion of placement if needed. X-ray imaging pending at time of admission with plan to discuss with orthopedic surgery if signs of fracture. No head trauma or neck trauma to warrant head or c-spine imaging at this time.     I have reviewed the nursing notes. I have reviewed the findings, diagnosis, plan and need for follow up with the patient.    Current Discharge Medication List          Final diagnoses:   Acute pain of right knee   Contusion of bone   MS  (multiple sclerosis) (H)       Domenico Bowman MD  [facility] EMERGENCY DEPARTMENT  11/16/2023     Domenico Bowman MD  11/23/23 0304

## 2024-03-15 ENCOUNTER — EMERGENCY (EMERGENCY)
Facility: HOSPITAL | Age: 24
LOS: 0 days | Discharge: HOME | End: 2024-03-15
Payer: MEDICAID

## 2024-03-15 VITALS
RESPIRATION RATE: 18 BRPM | DIASTOLIC BLOOD PRESSURE: 85 MMHG | SYSTOLIC BLOOD PRESSURE: 124 MMHG | HEART RATE: 106 BPM | TEMPERATURE: 98 F | OXYGEN SATURATION: 96 %

## 2024-03-15 VITALS
OXYGEN SATURATION: 95 % | TEMPERATURE: 99 F | RESPIRATION RATE: 19 BRPM | DIASTOLIC BLOOD PRESSURE: 92 MMHG | SYSTOLIC BLOOD PRESSURE: 135 MMHG | WEIGHT: 259.93 LBS | HEART RATE: 120 BPM | HEIGHT: 66 IN
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
## 2024-03-15 DIAGNOSIS — J45.909 UNSPECIFIED ASTHMA, UNCOMPLICATED: ICD-10-CM

## 2024-03-15 DIAGNOSIS — M79.671 PAIN IN RIGHT FOOT: ICD-10-CM
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
## 2024-03-15 PROCEDURE — 99284 EMERGENCY DEPT VISIT MOD MDM: CPT

## 2024-03-15 PROCEDURE — 73630 X-RAY EXAM OF FOOT: CPT | Mod: 26,RT

## 2024-03-15 PROCEDURE — 73610 X-RAY EXAM OF ANKLE: CPT | Mod: 26,RT

## 2024-03-15 NOTE — ED ADULT NURSE NOTE - CAS TRG GEN SKIN COLOR
Length Of Therapy: 2 months
Comments: Continue on dupixet. Patient reports an 80% improvement.
Add High Risk Medication Management Associated Diagnosis?: No
Detail Level: Zone
Normal for race

## 2024-03-15 NOTE — ED PROVIDER NOTE - PATIENT PORTAL LINK FT
You can access the FollowMyHealth Patient Portal offered by [facility] by registering at the following website: [email or web address]. By joining [employer]’s FollowMyHealth portal, you will also be able to view your health information using other applications (apps) compatible with our system.

## 2024-03-15 NOTE — ED ADULT TRIAGE NOTE - CHIEF COMPLAINT QUOTE
Patient presents to ED c/o to right ankle x 3 weeks after playing sports. patient noted with cane and ortho boot.  hx asthma

## 2024-03-15 NOTE — ED PROVIDER NOTE - OBJECTIVE STATEMENT
24 yo male A&OX4 PMH asthma presenting to ED c/o pain to right foot s/p basketball injury 3 weeks ago. Patient states landed on foot wrong, went to urgent care had x-ray of his foot which was negative and placed in hard sole boot. Patient states pain has been improving, only present when he ambulates, no ecchymosis/swelling. Came to ED today concerned that he is still experiencing some pain with ambulation after 3 weeks. Patient is concerned that his foot is broken. Patient denies pain at rest, has not been taking any Tylenol/Ibuprofen. Denies numbness, tingling, weakness, chest pain, palpitations, SOB, cough, abd pain, nausea, vomiting, fevers/chills.

## 2024-03-15 NOTE — ED PROVIDER NOTE - PHYSICAL EXAMINATION
CONSTITUTIONAL: Appears well, in no apparent distress  HEAD: Normocephalic, no obvious signs of trauma  EENT: PERRL, EOMI, nares patent no drainage, no pharyngeal erythema, swelling, or exudates  NECK: Trachea midline, no goiter  RESP: L/S equal clr, bilat, apices and bases, no accessory muscle use, speaking full sentences  CARDIC: Tachycardic, regular rhythm, +S1/S2, no peripheral edema  GI: ABD soft, nondistended, nontender on palpation, no palpable masses, negative Soliman's Sign  : No CVA Tenderness  MSK: +5 strength extremities x 4, full ROM without pain. Pedal pulses +2 bilat, cap refill <2seconds, toes warm to touch, right foot not tender on palpation, no crepitus, ecchymosis, or edema.   NEURO: A&OX4, No focal motor deficits/weakness, no sensory deficits, no slurred speech, no facial droop, normal gait

## 2024-03-15 NOTE — ED PROVIDER NOTE - NSFOLLOWUPINSTRUCTIONS_ED_ALL_ED_FT
Today you were seen in the ER for foot pain.     Continue using boot and cane as previously discussed.     Take Motrin 600 mg every 8 hours as needed for moderate pain-- take with food..    Take Tylenol 650mg (Two 325 mg pills) every 4-6 hours as needed for pain.    Rest, Ice, Elevate injured area    Follow-up with Podiatry, See referred doctor. Call today / next business day for close, prompt follow-up.    Return to Emergency room for any worsening or persistent pain, weakness, numbness, fever, color change to extremity, or any other concerning symptoms.    Advance activity as tolerated.     Continue all previously prescribed medications as directed unless otherwise instructed.     Follow up with your primary care physician in 48-72 hours- bring copies of your results.

## 2024-03-15 NOTE — ED ADULT NURSE NOTE - SUICIDE SCREENING DEPRESSION
Negative Infliximab Counseling:  I discussed with the patient the risks of infliximab including but not limited to myelosuppression, immunosuppression, autoimmune hepatitis, demyelinating diseases, lymphoma, and serious infections.  The patient understands that monitoring is required including a PPD at baseline and must alert us or the primary physician if symptoms of infection or other concerning signs are noted.

## 2024-03-15 NOTE — ED ADULT NURSE NOTE - OBJECTIVE STATEMENT
Pt presents to the ed c/o right ankle pain, pt was noted to have swollen ankle, tenderness on palpation, + dorsalis pedis pulse and + warmth.

## 2024-03-15 NOTE — ED ADULT NURSE NOTE - NSFALLRISKINTERV_ED_ALL_ED

## 2024-03-15 NOTE — ED ADULT NURSE NOTE - URINE COLOR
Dr ECHAVARRIA. Please place orders for pts lab work that will be due next month, in Epic.   Thank you  
Thank you.  ;)  
Was the patient seen in the last year in this department? Yes  [facility]    Does patient have an active prescription for medications requested? No     Received Request Via: Pharmacy    
yellow

## 2024-03-15 NOTE — ED PROVIDER NOTE - NSFOLLOWUPCLINICS_GEN_ALL_ED_FT
[facility]  Podiatry  [address] [place] [address] - [address] [place] [address]  Phone: [phone]  Fax:

## 2024-03-15 NOTE — ED PROVIDER NOTE - CARE PROVIDER_API CALL
Rabia Ortiz  Podiatric Medicine and Surgery  [address] [place] [id number]  Phone: [phone]  Fax: [phone]  Follow Up Time:

## 2024-03-15 NOTE — ED PROVIDER NOTE - PROGRESS NOTE DETAILS
Supervising Statement (LAYLA Lira): I have personally seen and examined this patient.  I have fully participated in the care of this patient. I have reviewed all pertinent clinical information, including history, physical exam, plan and the ACP Fellow's note and agree except as noted.    No reproducible pain on exam, states when he walks he feels it between his 1-3rd metatarsal, has improved but not gone. No swelling or bruising, independent xray read without fracture or dislocation, patient would like to leave and not wait for results, will dc with wet read and podiatry follow up, continue boot use.

## 2024-07-01 NOTE — ED PROVIDER NOTE - NS ED NOTE AC HIGH RISK COUNTRIES
No Total fluid removed 1.3 liters; MAP maintained at 65 and still on levo drip; total treatment time 3.5 hours; access free from bleeding;bruit and thrill present; awake and intubated; report given to primary RN.